# Patient Record
Sex: FEMALE | Race: WHITE | NOT HISPANIC OR LATINO | Employment: OTHER | URBAN - METROPOLITAN AREA
[De-identification: names, ages, dates, MRNs, and addresses within clinical notes are randomized per-mention and may not be internally consistent; named-entity substitution may affect disease eponyms.]

---

## 2017-06-09 ENCOUNTER — HOSPITAL ENCOUNTER (OUTPATIENT)
Dept: LAB | Facility: MEDICAL CENTER | Age: 72
End: 2017-06-09
Attending: FAMILY MEDICINE
Payer: MEDICARE

## 2017-06-09 LAB
ALBUMIN SERPL BCP-MCNC: 4.4 G/DL (ref 3.2–4.9)
ALBUMIN/GLOB SERPL: 1.6 G/DL
ALP SERPL-CCNC: 92 U/L (ref 30–99)
ALT SERPL-CCNC: 18 U/L (ref 2–50)
ANION GAP SERPL CALC-SCNC: 8 MMOL/L (ref 0–11.9)
AST SERPL-CCNC: 19 U/L (ref 12–45)
BASOPHILS # BLD AUTO: 0.4 % (ref 0–1.8)
BASOPHILS # BLD: 0.03 K/UL (ref 0–0.12)
BILIRUB SERPL-MCNC: 0.4 MG/DL (ref 0.1–1.5)
BUN SERPL-MCNC: 9 MG/DL (ref 8–22)
CALCIUM SERPL-MCNC: 9.8 MG/DL (ref 8.5–10.5)
CHLORIDE SERPL-SCNC: 104 MMOL/L (ref 96–112)
CHOLEST SERPL-MCNC: 195 MG/DL (ref 100–199)
CO2 SERPL-SCNC: 25 MMOL/L (ref 20–33)
CREAT SERPL-MCNC: 0.57 MG/DL (ref 0.5–1.4)
EOSINOPHIL # BLD AUTO: 0.06 K/UL (ref 0–0.51)
EOSINOPHIL NFR BLD: 0.8 % (ref 0–6.9)
ERYTHROCYTE [DISTWIDTH] IN BLOOD BY AUTOMATED COUNT: 44.2 FL (ref 35.9–50)
GFR SERPL CREATININE-BSD FRML MDRD: >60 ML/MIN/1.73 M 2
GLOBULIN SER CALC-MCNC: 2.7 G/DL (ref 1.9–3.5)
GLUCOSE SERPL-MCNC: 93 MG/DL (ref 65–99)
HCT VFR BLD AUTO: 41.6 % (ref 37–47)
HDLC SERPL-MCNC: 55 MG/DL
HGB BLD-MCNC: 13.8 G/DL (ref 12–16)
IMM GRANULOCYTES # BLD AUTO: 0.02 K/UL (ref 0–0.11)
IMM GRANULOCYTES NFR BLD AUTO: 0.3 % (ref 0–0.9)
LDLC SERPL CALC-MCNC: 121 MG/DL
LYMPHOCYTES # BLD AUTO: 2.21 K/UL (ref 1–4.8)
LYMPHOCYTES NFR BLD: 30.7 % (ref 22–41)
MCH RBC QN AUTO: 29.1 PG (ref 27–33)
MCHC RBC AUTO-ENTMCNC: 33.2 G/DL (ref 33.6–35)
MCV RBC AUTO: 87.6 FL (ref 81.4–97.8)
MONOCYTES # BLD AUTO: 0.61 K/UL (ref 0–0.85)
MONOCYTES NFR BLD AUTO: 8.5 % (ref 0–13.4)
NEUTROPHILS # BLD AUTO: 4.28 K/UL (ref 2–7.15)
NEUTROPHILS NFR BLD: 59.3 % (ref 44–72)
NRBC # BLD AUTO: 0 K/UL
NRBC BLD AUTO-RTO: 0 /100 WBC
PLATELET # BLD AUTO: 349 K/UL (ref 164–446)
PMV BLD AUTO: 11.3 FL (ref 9–12.9)
POTASSIUM SERPL-SCNC: 4.7 MMOL/L (ref 3.6–5.5)
PROT SERPL-MCNC: 7.1 G/DL (ref 6–8.2)
RBC # BLD AUTO: 4.75 M/UL (ref 4.2–5.4)
SODIUM SERPL-SCNC: 137 MMOL/L (ref 135–145)
TRIGL SERPL-MCNC: 95 MG/DL (ref 0–149)
TSH SERPL DL<=0.005 MIU/L-ACNC: 1.14 UIU/ML (ref 0.3–3.7)
WBC # BLD AUTO: 7.2 K/UL (ref 4.8–10.8)

## 2017-06-09 PROCEDURE — 36415 COLL VENOUS BLD VENIPUNCTURE: CPT

## 2017-06-09 PROCEDURE — 84443 ASSAY THYROID STIM HORMONE: CPT

## 2017-06-09 PROCEDURE — 80061 LIPID PANEL: CPT

## 2017-06-09 PROCEDURE — 85025 COMPLETE CBC W/AUTO DIFF WBC: CPT

## 2017-06-09 PROCEDURE — 80053 COMPREHEN METABOLIC PANEL: CPT

## 2017-06-13 ENCOUNTER — HOSPITAL ENCOUNTER (OUTPATIENT)
Dept: RADIOLOGY | Facility: MEDICAL CENTER | Age: 72
End: 2017-06-13
Attending: FAMILY MEDICINE
Payer: MEDICARE

## 2017-06-13 VITALS
HEART RATE: 68 BPM | TEMPERATURE: 97.5 F | HEIGHT: 61 IN | RESPIRATION RATE: 15 BRPM | DIASTOLIC BLOOD PRESSURE: 74 MMHG | SYSTOLIC BLOOD PRESSURE: 118 MMHG | WEIGHT: 178 LBS | OXYGEN SATURATION: 96 % | BODY MASS INDEX: 33.61 KG/M2

## 2017-06-13 DIAGNOSIS — G93.9 DISEASE OF BRAIN: ICD-10-CM

## 2017-06-13 DIAGNOSIS — G93.9 CONDITION OF BRAIN: ICD-10-CM

## 2017-06-13 DIAGNOSIS — M54.50 LOW BACK PAIN: ICD-10-CM

## 2017-06-13 DIAGNOSIS — M54.2 NECK PAIN: ICD-10-CM

## 2017-06-13 PROCEDURE — A9579 GAD-BASE MR CONTRAST NOS,1ML: HCPCS | Performed by: FAMILY MEDICINE

## 2017-06-13 PROCEDURE — 700101 HCHG RX REV CODE 250

## 2017-06-13 PROCEDURE — 72156 MRI NECK SPINE W/O & W/DYE: CPT

## 2017-06-13 PROCEDURE — 700111 HCHG RX REV CODE 636 W/ 250 OVERRIDE (IP)

## 2017-06-13 PROCEDURE — 70553 MRI BRAIN STEM W/O & W/DYE: CPT

## 2017-06-13 PROCEDURE — 700117 HCHG RX CONTRAST REV CODE 255: Performed by: FAMILY MEDICINE

## 2017-06-13 PROCEDURE — 72158 MRI LUMBAR SPINE W/O & W/DYE: CPT

## 2017-06-13 PROCEDURE — 72157 MRI CHEST SPINE W/O & W/DYE: CPT

## 2017-06-13 RX ADMIN — GADODIAMIDE 20 ML: 287 INJECTION INTRAVENOUS at 10:51

## 2017-06-13 ASSESSMENT — PAIN SCALES - GENERAL
PAINLEVEL_OUTOF10: 0

## 2017-06-13 NOTE — IP AVS SNAPSHOT
" <p align=\"LEFT\"><IMG SRC=\"[email or web address]\" alt=\"Image\" WIDTH=\"50%\" HEIGHT=\"200\" BORDER=\"\"></p>      `           Jessica Abad   MRN: [id number]    Department:  [address]   Date of Visit:              `  Discharge Instructions       MRI ADULT DISCHARGE INSTRUCTIONS    You have been medicated today for your scan. Please follow the instructions below to ensure your safe recovery. If you have any questions or problems, feel free to call us at [phone] or [phone].     1.   Have someone stay with you to assist you as needed.    2.   Do not drive or operate any mechanical devices.    3.   Do not perform any activity that requires concentration. Make no major decisions over the next 24 hours.     4.   Be careful changing positions from laying down to sitting or standing, as you may become dizzy.     5.   Do not drink alcohol for 48 hours.    6.   There are no restrictions for eating your normal meals. Drink fluids.    7.   You may continue your usual medications for pain, tranquilizers, muscle relaxants or sedatives when awake.     8.   Tomorrow, you may continue your normal daily activities.     9.   Pressure dressing on 10 - 15 minutes. If swelling or bleeding occurs when removed, continue placing direct pressure on injection site for another 5 minutes, or until bleeding stops.     I have been informed of and understand the above discharge instructions. Midazolam (VERSED)  What is this medicine?  You were given MIDAZOLAM (TE kelsey) for your procedure today. This medication is a benzodiazepine. It is used to cause relaxation or sleep before surgery and to block the memory of the procedure.  This medicine may be used for other purposes; ask your health care provider or pharmacist if you have questions.  What side effects may I notice from receiving this medicine?  Side effects that you should report to your doctor or health care professional as soon as " possible:  • allergic reactions like skin rash, itching or hives, swelling of the face, lips, or tongue  • breathing problems  • confusion  • dizziness or lightheadedness  • fast, irregular heartbeat  • halluninations during recovery  • numbness or tingling in the hands or feet  • pain, redness, or swelling at site where injected  • seizures  Side effects that usually do not require medical attention (report to your doctor or health care professional if they continue or are bothersome):  • coughing  • headache  • hiccups  • involuntary eye and muscle movements  • loss of memory of events just before, during, and after use  • nausea, vomiting  • speech problems  • tiredness  • trouble sleeping or nightmares  This list may not describe all possible side effects. Call your doctor for medical advice about side effects. You may report side effects to FDA at 6-953-FDA-1688.    Fentanyl  What is this medicine?  You were given FENTANYL (FEN ta nil) for your procedure today, it is a pain reliever. It is used to treat breakthrough pain that your long acting pain medicine does not control. Do not use this medicine for a pain that will go away in a few days like pain from surgery, doctor or dentist visits.   This medicine may be used for other purposes; ask your health care provider or pharmacist if you have questions.  What side effects may I notice from receiving this medicine?  Side effects that you should report to your doctor or health care professional as soon as possible:  • allergic reactions like skin rash, itching or hives, swelling of the face, lips, or tongue  • breathing problems  • changes in vision  • confusion  • dry mouth  • feeling faint, lightheaded  • hallucination  • irregular heartbeat  • mouth pain, sores  • problems with balance, talking, walking  • trouble passing urine or change in the amount of urine  • unusual bleeding or bruising  • unusually weak or tired  Side effects that usually do not require  medical attention (report to your doctor or health care professional if they continue or are bothersome):  • dizzy  • headache  • loss of appetite  • nausea, vomiting  • sweating  • tingling in mouth  This list may not describe all possible side effects. Call your doctor for medical advice about side effects. You may report side effects to [facility] at [phone]-[phone].  MRI ADULT DISCHARGE INSTRUCTIONS    You have been medicated today for your scan. Please follow the instructions below to ensure your safe recovery. If you have any questions or problems, feel free to call us at [phone] or [phone].     1.   Have someone stay with you to assist you as needed.    2.   Do not drive or operate any mechanical devices.    3.   Do not perform any activity that requires concentration. Make no major decisions over the next 24 hours.     4.   Be careful changing positions from laying down to sitting or standing, as you may become dizzy.     5.   Do not drink alcohol for 48 hours.    6.   There are no restrictions for eating your normal meals. Drink fluids.    7.   You may continue your usual medications for pain, tranquilizers, muscle relaxants or sedatives when awake.     8.   Tomorrow, you may continue your normal daily activities.     9.   Pressure dressing on 10 - 15 minutes. If swelling or bleeding occurs when removed, continue placing direct pressure on injection site for another 5 minutes, or until bleeding stops.     I have been informed of and understand the above discharge instructions.      `       Diet / Nutrition:    Follow any diet instructions given to you by your doctor or the dietician, including how much salt (sodium) you are allowed each day.    If you are overweight, talk to your doctor about a weight reduction plan.    Activity:    Remain physically active following your doctor's instructions about exercise and activity.    Rest often.     Any time you become even a little tired or short of breath, SIT DOWN and  rest.    Worsening Symptoms:    Report any of the following signs and symptoms to the doctor's office immediately:    *Pain of jaw, arm, or neck  *Chest pain not relieved by medication                               *Dizziness or loss of consciousness  *Difficulty breathing even when at rest   *More tired than usual                                       *Bleeding drainage or swelling of surgical site  *Swelling of feet, ankles, legs or stomach                 *Fever (>100ºF)  *Pink or blood tinged sputum  *Weight gain (3lbs/day or 5lbs /week)           *Shock from internal defibrillator (if applicable)  *Palpitations or irregular heartbeats                *Cool and/or numb extremities    Stroke Awareness    Common Risk Factors for Stroke include:    Age  Atrial Fibrillation  Carotid Artery Stenosis  Diabetes Mellitus  Excessive alcohol consumption  High blood pressure  Overweight   Physical inactivity  Smoking    Warning signs and symptoms of a stroke include:    *Sudden numbness or weakness of the face, arm or leg (especially on one side of the body).  *Sudden confusion, trouble speaking or understanding.  *Sudden trouble seeing in one or both eyes.  *Sudden trouble walking, dizziness, loss of balance or coordination.Sudden severe headache with no known cause.    It is very important to get treatment quickly when a stroke occurs. If you experience any of the above warning signs, call 911 immediately.                    `     Quit Smoking / Tobacco Use:    I understand the use of any tobacco products increases my chance of suffering from future heart disease or stroke and could cause other illnesses which may shorten my life. Quitting the use of tobacco products is the single most important thing I can do to improve my health. For further information on smoking / tobacco cessation call a Toll Free Quit Line at 1-459.449.2652 (*[employer] [place]) or 1-639.278.5411 (American [employer] Association) or you can access  the web based program at www.lungusa.org.    [place] Tobacco Users Help Line:  (796) 225-8066       Toll Free: 1-678.524.4271  Quit Tobacco Program [facility] (784)869-7660    Crisis Hotline:    [place] Crisis Hotline:  0-853-KEQBFHG or 1-534.191.4917    [place] Crisis Hotline:    1-236.557.8027 or 851-571-5099    Discharge Survey:   Thank you for choosing [facility]. We hope we did everything we could to make your hospital stay a pleasant one. You may be receiving a phone survey and we would appreciate your time and participation in answering the questions. Your input is very valuable to us in our efforts to improve our service to our patients and their families.        My signature on this form indicates that:    1. I have reviewed and understand the above information.  2. My questions regarding this information have been answered to my satisfaction.  3. I have formulated a plan with my discharge nurse to obtain my prescribed medications for home.                   `           Patient or Caregiver Signature:  ____________________________________________________________    Date:  ____________________________________________________________       `

## 2017-06-13 NOTE — DISCHARGE INSTRUCTIONS
MRI ADULT DISCHARGE INSTRUCTIONS    You have been medicated today for your scan. Please follow the instructions below to ensure your safe recovery. If you have any questions or problems, feel free to call us at [phone] or [phone].     1.   Have someone stay with you to assist you as needed.    2.   Do not drive or operate any mechanical devices.    3.   Do not perform any activity that requires concentration. Make no major decisions over the next 24 hours.     4.   Be careful changing positions from laying down to sitting or standing, as you may become dizzy.     5.   Do not drink alcohol for 48 hours.    6.   There are no restrictions for eating your normal meals. Drink fluids.    7.   You may continue your usual medications for pain, tranquilizers, muscle relaxants or sedatives when awake.     8.   Tomorrow, you may continue your normal daily activities.     9.   Pressure dressing on 10 - 15 minutes. If swelling or bleeding occurs when removed, continue placing direct pressure on injection site for another 5 minutes, or until bleeding stops.     I have been informed of and understand the above discharge instructions. Midazolam (VERSED)  What is this medicine?  You were given MIDAZOLAM (TE kelsey) for your procedure today. This medication is a benzodiazepine. It is used to cause relaxation or sleep before surgery and to block the memory of the procedure.  This medicine may be used for other purposes; ask your health care provider or pharmacist if you have questions.  What side effects may I notice from receiving this medicine?  Side effects that you should report to your doctor or health care professional as soon as possible:  • allergic reactions like skin rash, itching or hives, swelling of the face, lips, or tongue  • breathing problems  • confusion  • dizziness or lightheadedness  • fast, irregular heartbeat  • halluninations during recovery  • numbness or tingling in the hands or feet  • pain, redness,  or swelling at site where injected  • seizures  Side effects that usually do not require medical attention (report to your doctor or health care professional if they continue or are bothersome):  • coughing  • headache  • hiccups  • involuntary eye and muscle movements  • loss of memory of events just before, during, and after use  • nausea, vomiting  • speech problems  • tiredness  • trouble sleeping or nightmares  This list may not describe all possible side effects. Call your doctor for medical advice about side effects. You may report side effects to FDA at 5-856-TJS-9710.    Fentanyl  What is this medicine?  You were given FENTANYL (FEN ta nil) for your procedure today, it is a pain reliever. It is used to treat breakthrough pain that your long acting pain medicine does not control. Do not use this medicine for a pain that will go away in a few days like pain from surgery, doctor or dentist visits.   This medicine may be used for other purposes; ask your health care provider or pharmacist if you have questions.  What side effects may I notice from receiving this medicine?  Side effects that you should report to your doctor or health care professional as soon as possible:  • allergic reactions like skin rash, itching or hives, swelling of the face, lips, or tongue  • breathing problems  • changes in vision  • confusion  • dry mouth  • feeling faint, lightheaded  • hallucination  • irregular heartbeat  • mouth pain, sores  • problems with balance, talking, walking  • trouble passing urine or change in the amount of urine  • unusual bleeding or bruising  • unusually weak or tired  Side effects that usually do not require medical attention (report to your doctor or health care professional if they continue or are bothersome):  • dizzy  • headache  • loss of appetite  • nausea, vomiting  • sweating  • tingling in mouth  This list may not describe all possible side effects. Call your doctor for medical advice about  side effects. You may report side effects to FDA at 8-189-UQL-1384.  MRI ADULT DISCHARGE INSTRUCTIONS    You have been medicated today for your scan. Please follow the instructions below to ensure your safe recovery. If you have any questions or problems, feel free to call us at [phone] or [phone].     1.   Have someone stay with you to assist you as needed.    2.   Do not drive or operate any mechanical devices.    3.   Do not perform any activity that requires concentration. Make no major decisions over the next 24 hours.     4.   Be careful changing positions from laying down to sitting or standing, as you may become dizzy.     5.   Do not drink alcohol for 48 hours.    6.   There are no restrictions for eating your normal meals. Drink fluids.    7.   You may continue your usual medications for pain, tranquilizers, muscle relaxants or sedatives when awake.     8.   Tomorrow, you may continue your normal daily activities.     9.   Pressure dressing on 10 - 15 minutes. If swelling or bleeding occurs when removed, continue placing direct pressure on injection site for another 5 minutes, or until bleeding stops.     I have been informed of and understand the above discharge instructions.

## 2017-07-06 ENCOUNTER — OFFICE VISIT (OUTPATIENT)
Dept: NEUROLOGY | Facility: MEDICAL CENTER | Age: 72
End: 2017-07-06
Payer: MEDICARE

## 2017-07-06 VITALS
OXYGEN SATURATION: 92 % | WEIGHT: 177.6 LBS | HEART RATE: 81 BPM | TEMPERATURE: 98 F | RESPIRATION RATE: 16 BRPM | BODY MASS INDEX: 33.57 KG/M2 | SYSTOLIC BLOOD PRESSURE: 128 MMHG | DIASTOLIC BLOOD PRESSURE: 84 MMHG

## 2017-07-06 DIAGNOSIS — R90.89 ABNORMAL BRAIN MRI: ICD-10-CM

## 2017-07-06 DIAGNOSIS — M47.817 LUMBOSACRAL SPONDYLOSIS WITHOUT MYELOPATHY: ICD-10-CM

## 2017-07-06 PROCEDURE — 99212 OFFICE O/P EST SF 10 MIN: CPT | Performed by: NURSE PRACTITIONER

## 2017-07-06 RX ORDER — TIZANIDINE 4 MG/1
4 TABLET ORAL EVERY 6 HOURS PRN
COMMUNITY
End: 2019-04-30 | Stop reason: SDUPTHER

## 2017-07-06 RX ORDER — ROPINIROLE 2 MG/1
2 TABLET, FILM COATED ORAL NIGHTLY
COMMUNITY
End: 2019-01-29

## 2017-07-06 ASSESSMENT — PAIN SCALES - GENERAL: PAINLEVEL: 6=MODERATE PAIN

## 2017-07-06 ASSESSMENT — PATIENT HEALTH QUESTIONNAIRE - PHQ9: CLINICAL INTERPRETATION OF PHQ2 SCORE: 0

## 2017-07-06 NOTE — PROGRESS NOTES
Subjective:      Jessica Abad is a 71 y.o. female who presents with Follow-Up for Polyneuropathy pain, back pain, multiple medication concerns:        HPI  Here for a review of recent MRI's that were ordered by her PCP.  She has been unable to discuss with the PCP and thus is here.    Brain MRI:  1.  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 On today's exam, there are 2 tiny enhancing nodular foci measuring 3 mm and 5 mm respectively. No interval increase in overall size or   mass effect of this lesion. 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 This does not necessarily represent progression of tumor grade. Recommend follow-up imaging surveillance as clinically   indicated.  2.  Mild cerebral atrophy. Age-appropriate.  3.  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      Current Outpatient Prescriptions   Medication Sig Dispense Refill   • tizanidine (ZANAFLEX) 4 MG Tab Take 4 mg by mouth every 6 hours as needed.     • ropinirole (REQUIP) 2 MG tablet Take 2 mg by mouth every evening.     • Coenzyme Q10 100 MG Cap Take 1 Cap by mouth every day.     • ascorbic acid (ASCORBIC ACID) 500 MG Tab Take 1,000 mg by mouth 2 Times a Day.     • Probiotic Product (PROBIOTIC DAILY PO) Take 1 Cap by mouth every day.     • zolpidem (AMBIEN) 10 MG Tab Take 1 Tab by mouth at bedtime as needed for Sleep. 90 Tab 1   • propranolol (INDERAL) 40 MG Tab Take 1 Tab by mouth 2 times a day. (Patient taking differently: Take 40 mg by mouth 2 times a day. TAKES FOR TREMORS) 60 Tab 11   • diphenhydrAMINE (BENADRYL) 50 MG Cap Take 1 Cap by mouth 3 times a day. 90 Cap 5   • clonazepam (KLONOPIN) 1 MG Tab Take 2 Tabs by mouth every bedtime. 60 Tab 5   • tramadol (ULTRAM) 50 MG TABS Take 50 mg by mouth 3 times a day.     • CRANBERRY PO Take 3 Tabs by  mouth 2 Times a Day.     • gabapentin (NEURONTIN) 800 MG tablet Take 1 Tab by mouth 4 times a day. 120 Tab 5   • ibuprofen (MOTRIN) 800 MG TABS Take 1 Tab by mouth every 8 hours as needed for Mild Pain. WITH FOOD 90 Each 3   • Cholecalciferol (VITAMIN D) 2000 UNITS CAPS Take 1 Cap by mouth every day.       • Non Formulary Request Take 1 Tab by mouth 3 times a day. **BETA TCD** for gallbladder     • Non Formulary Request Take 30 Drops by mouth 3 times a day. **SUPER PHOSPHATE LIQ**     • Non Formulary Request Take 1 Tab by mouth every bedtime. **MAGNESIUM AND POTASSIUM ASPARTATE**     • Non Formulary Request Take 1 Tab by mouth 3 times a day. **LIVOTRIT PLUS**     • cyclobenzaprine (FLEXERIL) 10 MG TABS Take 1 Tab by mouth 3 times a day as needed. (Patient not taking: Reported on 7/6/2017) 30 Tab 0     No current facility-administered medications for this visit.       Review of Systems   Constitutional: Positive for malaise/fatigue.   HENT: Negative for hearing loss, nosebleeds and sore throat.         No recent head injury.   Eyes: Negative for double vision.        No new loss of vision.   Respiratory: Negative for cough.         No recent lung infections.   Cardiovascular: Negative for chest pain.   Gastrointestinal: Negative for abdominal pain, diarrhea, nausea and vomiting.   Genitourinary: Negative.    Musculoskeletal: Positive for back pain, joint pain and neck pain.   Skin: Negative.    Neurological: Negative for headaches.   Endo/Heme/Allergies:        No history of endocrine dysfunction.  No new problems.   Psychiatric/Behavioral: Positive for depression. The patient is not nervous/anxious.         No recent mood changes.          Objective:     /84 mmHg  Pulse 81  Temp(Src) 36.7 °C (98 °F)  Resp 16  Wt 80.559 kg (177 lb 9.6 oz)  SpO2 92%     Physical Exam   Constitutional: She is oriented to person, place, and time. She appears well-developed and well-nourished. No distress.   HENT:   Head:  Normocephalic and atraumatic.   Eyes: EOM are normal.   Neck: Normal range of motion.   Cardiovascular: Normal rate and regular rhythm.    Pulmonary/Chest: Effort normal.   Neurological: She is alert and oriented to person, place, and time. She exhibits normal muscle tone. Gait (minimal assist with a cane.) abnormal.   No observable changes in neurologic status.  See initial new patient examination for details.    Skin: Skin is warm.   Psychiatric: She has a normal mood and affect.               Assessment/Plan:     Reviewed her images at length with Dr Preciado and Ms Abad.  At this time, it would be recommended to perform a serial Brain MRI in 6 months.    Referral placed for [facility] [place].    To return in 6 months or sooner if needed.    Addendum Date 3/9/2018:  To my knowledge, there have been no medical changes to Ms Abad in the past 30 days.  GEO Ochoa

## 2017-07-06 NOTE — MR AVS SNAPSHOT
Jessica Abad   2017 2:40 PM   Office Visit   MRN: [id number]    Department:  [facility]   Dept Phone:  [phone]    Description:  Female : 1945   Provider:  SHAYE Lopez           Reason for Visit     Follow-Up MRI result - 2nd opinion      Allergies as of 2017     Allergen Noted Reactions    Nsaids 2014   Hives, Rash, Itching    Asa [Aspirin] 10/16/2009       BAD ACID REFLUX    Codeine 10/16/2009       Sulfa Drugs 10/16/2009         You were diagnosed with     Lumbosacral spondylosis without myelopathy   [721.3.ICD-9-CM]       Abnormal brain MRI   [799897]         Vital Signs     Blood Pressure Pulse Temperature Respirations Weight Oxygen Saturation    128/84 mmHg 81 36.7 °C (98 °F) 16 80.559 kg (177 lb 9.6 oz) 92%    Smoking Status                   Never Smoker            Basic Information     Date Of Birth Sex Race Ethnicity Preferred Language    1945 Female White Non- English      Problem List              ICD-10-CM Priority Class Noted - Resolved    Fibromyalgia M79.7   Unknown - Present    RLS (restless legs syndrome) G25.81   Unknown - Present    Cerebral infarction (CMS-HCC) I63.9   Unknown - Present    Ovarian cyst, right N83.201   2011 - Present    Anemia D64.9   2011 - Present    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, 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   Unknown - Present    Anxiety F41.9   2011 - Present    Back pain M54.9   2014 - Present    HTN (hypertension) I10   2015 - Present    Lumbosacral spondylosis without myelopathy M47.817   3/13/2015 - Present      Health Maintenance        Date Due Completion Dates    PAP SMEAR 1967 ---    MAMMOGRAM 1986 ---    COLONOSCOPY 1996 ---    IMM ZOSTER VACCINE 2006 ---    IMM DTaP/Tdap/Td Vaccine (1 - Tdap) 10/17/2009 10/16/2009    BONE DENSITY 2011 ---    IMM PNEUMOCOCCAL 65+ (ADULT) LOW/MEDIUM RISK SERIES (1 of 2 - PCV13) 2011 ---    IMM  INFLUENZA (1) 9/1/2017 ---            Current Immunizations     TD Vaccine 10/16/2009  5:30 PM      Below and/or attached are the medications your provider expects you to take. Review all of your home medications and newly ordered medications with your provider and/or pharmacist. Follow medication instructions as directed by your provider and/or pharmacist. Please keep your medication list with you and share with your provider. Update the information when medications are discontinued, doses are changed, or new medications (including over-the-counter products) are added; and carry medication information at all times in the event of emergency situations     Allergies:  NSAIDS - Hives,Rash,Itching     ASA - (reactions not documented)     CODEINE - (reactions not documented)     SULFA DRUGS - (reactions not documented)               Medications  Valid as of: July 06, 2017 -  3:30 PM    Generic Name Brand Name Tablet Size Instructions for use    Ascorbic Acid (Tab) ascorbic acid 500 MG Take 1,000 mg by mouth 2 Times a Day.        Cholecalciferol (Cap) Vitamin D 2000 UNITS Take 1 Cap by mouth every day.          ClonazePAM (Tab) KLONOPIN 1 MG Take 2 Tabs by mouth every bedtime.        Coenzyme Q10 (Cap) Coenzyme Q10 100 MG Take 1 Cap by mouth every day.        Cranberry   Take 3 Tabs by mouth 2 Times a Day.        Cyclobenzaprine HCl (Tab) FLEXERIL 10 MG Take 1 Tab by mouth 3 times a day as needed.        DiphenhydrAMINE HCl (Cap) BENADRYL 50 MG Take 1 Cap by mouth 3 times a day.        Gabapentin (Tab) NEURONTIN 800 MG Take 1 Tab by mouth 4 times a day.        Ibuprofen (Tab) MOTRIN 800 MG Take 1 Tab by mouth every 8 hours as needed for Mild Pain. WITH FOOD        Non Formulary Request Non Formulary Request  Take 1 Tab by mouth 3 times a day. **BETA TCD** for gallbladder        Non Formulary Request Non Formulary Request  Take 30 Drops by mouth 3 times a day. **SUPER PHOSPHATE LIQ**        Non Formulary Request Non  Formulary Request  Take 1 Tab by mouth every bedtime. **MAGNESIUM AND POTASSIUM ASPARTATE**        Non Formulary Request Non Formulary Request  Take 1 Tab by mouth 3 times a day. **LIVOTRIT PLUS**        Probiotic Product   Take 1 Cap by mouth every day.        Propranolol HCl (Tab) INDERAL 40 MG Take 1 Tab by mouth 2 times a day.        ROPINIRole HCl (Tab) REQUIP 2 MG Take 2 mg by mouth every evening.        TiZANidine HCl (Tab) ZANAFLEX 4 MG Take 4 mg by mouth every 6 hours as needed.        TraMADol HCl (Tab) ULTRAM 50 MG Take 50 mg by mouth 3 times a day.        Zolpidem Tartrate (Tab) AMBIEN 10 MG Take 1 Tab by mouth at bedtime as needed for Sleep.        .                 Medicines prescribed today were sent to:     [id number] - [place], [place] - [address]    [address] [place] [address]    Phone: [phone] Fax: [phone]    Open 24 Hours?: No      Medication refill instructions:       If your prescription bottle indicates you have medication refills left, it is not necessary to call your provider’s office. Please contact your pharmacy and they will refill your medication.    If your prescription bottle indicates you do not have any refills left, you may request refills at any time through one of the following ways: The online [employer] system (except Urgent Care), by calling your provider’s office, or by asking your pharmacy to contact your provider’s office with a refill request. Medication refills are processed only during regular business hours and may not be available until the next business day. Your provider may request additional information or to have a follow-up visit with you prior to refilling your medication.   *Please Note: Medication refills are assigned a new Rx number when refilled electronically. Your pharmacy may indicate that no refills were authorized even though a new prescription for the same medication is available at the pharmacy. Please request the medicine by name  with the pharmacy before contacting your provider for a refill.        Your To Do List     Future Labs/Procedures Complete By Expires    MR-BRAIN-WITH & W/O  As directed 7/6/2018      Referral     A referral request has been sent to our patient care coordination department. Please allow 3-5 business days for us to process this request and contact you either by phone or mail. If you do not hear from us by the 5th business day, please call us at [phone].        Other Notes About Your Plan     UROLOGIST DR TARAN Thapa Access Code: [email or web address]  Expires: 7/9/2017 11:17 AM    [employer]  A secure, online tool to manage your health information     [employer]’s [employer]® is a secure, online tool that connects you to your personalized health information from the privacy of your home -- day or night - making it very easy for you to manage your healthcare. Once the activation process is completed, you can even access your medical information using the [employer] jaycob, which is available for free in the Apple Jacyob store or Google Play store.     [employer] provides the following levels of access (as shown below):   My Chart Features   [facility] Primary Care Doctor [facility]  Specialists [facility]  Urgent  Care Non-[facility]  Primary Care  Doctor   Email your healthcare team securely and privately 24/7 X X X    Manage appointments: schedule your next appointment; view details of past/upcoming appointments X      Request prescription refills. X      View recent personal medical records, including lab and immunizations X X X X   View health record, including health history, allergies, medications X X X X   Read reports about your outpatient visits, procedures, consult and ER notes X X X X   See your discharge summary, which is a recap of your hospital and/or ER visit that includes your diagnosis, lab results, and care plan. X X       How to register for [employer]:  1. Go to  [email or web address].  2. Click on the Sign Up  Now box, which takes you to the New Member Sign Up page. You will need to provide the following information:  a. Enter your [employer] Access Code exactly as it appears at the top of this page. (You will not need to use this code after you’ve completed the sign-up process. If you do not sign up before the expiration date, you must request a new code.)   b. Enter your date of birth.   c. Enter your home email address.   d. Click Submit, and follow the next screen’s instructions.  3. Create a [employer] ID. This will be your [employer] login ID and cannot be changed, so think of one that is secure and easy to remember.  4. Create a [employer] password. You can change your password at any time.  5. Enter your Password Reset Question and Answer. This can be used at a later time if you forget your password.   6. Enter your e-mail address. This allows you to receive e-mail notifications when new information is available in [employer].  7. Click Sign Up. You can now view your health information.    For assistance activating your [employer] account, call [phone]

## 2017-08-04 ENCOUNTER — HOSPITAL ENCOUNTER (OUTPATIENT)
Facility: MEDICAL CENTER | Age: 72
End: 2017-08-04
Attending: FAMILY MEDICINE
Payer: MEDICARE

## 2017-08-04 PROCEDURE — 87086 URINE CULTURE/COLONY COUNT: CPT

## 2017-08-06 LAB
BACTERIA UR CULT: NORMAL
SIGNIFICANT IND 70042: NORMAL
SOURCE SOURCE: NORMAL

## 2017-12-08 DIAGNOSIS — I63.9 CEREBRAL INFARCTION, UNSPECIFIED MECHANISM (HCC): ICD-10-CM

## 2017-12-08 DIAGNOSIS — R90.89 ABNORMAL BRAIN MRI: ICD-10-CM

## 2017-12-09 ENCOUNTER — PATIENT OUTREACH (OUTPATIENT)
Dept: HEALTH INFORMATION MANAGEMENT | Facility: OTHER | Age: 72
End: 2017-12-09

## 2017-12-20 ENCOUNTER — TELEPHONE (OUTPATIENT)
Dept: NEUROLOGY | Facility: MEDICAL CENTER | Age: 72
End: 2017-12-20

## 2018-01-04 ENCOUNTER — TELEPHONE (OUTPATIENT)
Dept: NEUROLOGY | Facility: MEDICAL CENTER | Age: 73
End: 2018-01-04

## 2018-01-04 DIAGNOSIS — Q04.8 DYSPLASTIC CEREBELLAR GANGLIOCYTOMA (HCC): ICD-10-CM

## 2018-01-04 DIAGNOSIS — D36.10 DYSPLASTIC CEREBELLAR GANGLIOCYTOMA (HCC): ICD-10-CM

## 2018-01-05 NOTE — TELEPHONE ENCOUNTER
Could you please order this? Is it needed?    Labs with LIP order: Creatinine within past 60 days (required for patients 60 years old or greater, patients with ARF, or patients with diabetes); Pregnancy test if indicated for women ages 12-60.    So that patient can get MRI done

## 2018-02-09 ENCOUNTER — APPOINTMENT (OUTPATIENT)
Dept: RADIOLOGY | Facility: MEDICAL CENTER | Age: 73
End: 2018-02-09
Attending: NURSE PRACTITIONER
Payer: MEDICARE

## 2018-02-09 ENCOUNTER — TELEPHONE (OUTPATIENT)
Dept: NEUROLOGY | Facility: MEDICAL CENTER | Age: 73
End: 2018-02-09

## 2018-02-09 NOTE — TELEPHONE ENCOUNTER
Per Alessandra in imaging patient needs lab orders for BMP and CBC prior to her MRI with sedation. Could you please put these orders into EPIC? Thanks.

## 2018-03-06 DIAGNOSIS — R94.31 ABNORMAL ELECTROCARDIOGRAM: ICD-10-CM

## 2018-03-06 DIAGNOSIS — Z86.2 HISTORY OF ANEMIA: ICD-10-CM

## 2018-03-06 DIAGNOSIS — R93.89 ABNORMAL MRI: ICD-10-CM

## 2018-03-08 ENCOUNTER — HOSPITAL ENCOUNTER (OUTPATIENT)
Dept: CARDIOLOGY | Facility: MEDICAL CENTER | Age: 73
End: 2018-03-08
Attending: NURSE PRACTITIONER
Payer: MEDICARE

## 2018-03-08 ENCOUNTER — HOSPITAL ENCOUNTER (OUTPATIENT)
Dept: LAB | Facility: MEDICAL CENTER | Age: 73
End: 2018-03-08
Attending: NURSE PRACTITIONER
Payer: MEDICARE

## 2018-03-08 DIAGNOSIS — Q04.8 DYSPLASTIC CEREBELLAR GANGLIOCYTOMA (HCC): ICD-10-CM

## 2018-03-08 DIAGNOSIS — Z86.2 HISTORY OF ANEMIA: ICD-10-CM

## 2018-03-08 DIAGNOSIS — D36.10 DYSPLASTIC CEREBELLAR GANGLIOCYTOMA (HCC): ICD-10-CM

## 2018-03-08 LAB
BASOPHILS # BLD AUTO: 0.5 % (ref 0–1.8)
BASOPHILS # BLD: 0.04 K/UL (ref 0–0.12)
CREAT SERPL-MCNC: 0.77 MG/DL (ref 0.5–1.4)
EKG IMPRESSION: NORMAL
EOSINOPHIL # BLD AUTO: 0.15 K/UL (ref 0–0.51)
EOSINOPHIL NFR BLD: 1.8 % (ref 0–6.9)
ERYTHROCYTE [DISTWIDTH] IN BLOOD BY AUTOMATED COUNT: 44.2 FL (ref 35.9–50)
HCT VFR BLD AUTO: 43.6 % (ref 37–47)
HGB BLD-MCNC: 14 G/DL (ref 12–16)
IMM GRANULOCYTES # BLD AUTO: 0.02 K/UL (ref 0–0.11)
IMM GRANULOCYTES NFR BLD AUTO: 0.2 % (ref 0–0.9)
LYMPHOCYTES # BLD AUTO: 2.97 K/UL (ref 1–4.8)
LYMPHOCYTES NFR BLD: 36.1 % (ref 22–41)
MCH RBC QN AUTO: 28.7 PG (ref 27–33)
MCHC RBC AUTO-ENTMCNC: 32.1 G/DL (ref 33.6–35)
MCV RBC AUTO: 89.3 FL (ref 81.4–97.8)
MONOCYTES # BLD AUTO: 0.69 K/UL (ref 0–0.85)
MONOCYTES NFR BLD AUTO: 8.4 % (ref 0–13.4)
NEUTROPHILS # BLD AUTO: 4.36 K/UL (ref 2–7.15)
NEUTROPHILS NFR BLD: 53 % (ref 44–72)
NRBC # BLD AUTO: 0 K/UL
NRBC BLD-RTO: 0 /100 WBC
PLATELET # BLD AUTO: 346 K/UL (ref 164–446)
PMV BLD AUTO: 11.4 FL (ref 9–12.9)
RBC # BLD AUTO: 4.88 M/UL (ref 4.2–5.4)
WBC # BLD AUTO: 8.2 K/UL (ref 4.8–10.8)

## 2018-03-08 PROCEDURE — 82565 ASSAY OF CREATININE: CPT

## 2018-03-08 PROCEDURE — 93010 ELECTROCARDIOGRAM REPORT: CPT | Performed by: INTERNAL MEDICINE

## 2018-03-08 PROCEDURE — 36415 COLL VENOUS BLD VENIPUNCTURE: CPT

## 2018-03-08 PROCEDURE — 85025 COMPLETE CBC W/AUTO DIFF WBC: CPT

## 2018-03-08 PROCEDURE — 93005 ELECTROCARDIOGRAM TRACING: CPT | Performed by: NURSE PRACTITIONER

## 2018-03-09 ENCOUNTER — TELEPHONE (OUTPATIENT)
Dept: NEUROLOGY | Facility: MEDICAL CENTER | Age: 73
End: 2018-03-09

## 2018-03-09 ASSESSMENT — ENCOUNTER SYMPTOMS
DOUBLE VISION: 0
DIARRHEA: 0
DEPRESSION: 1
HEADACHES: 0
NERVOUS/ANXIOUS: 0
VOMITING: 0
NAUSEA: 0
NECK PAIN: 1
ABDOMINAL PAIN: 0
BACK PAIN: 1
COUGH: 0
SORE THROAT: 0

## 2018-03-09 NOTE — TELEPHONE ENCOUNTER
Spoke with Jessenia in imaging scheduling. She stated that everything patient needed to have completed prior to her MRI on Monday was completed.

## 2018-03-09 NOTE — TELEPHONE ENCOUNTER
Left voicemail on patient's phone letting her know that I had spoken to Jessenia in imaging scheduling and that everything was good to go for her MRI with sedation on Monday.

## 2018-03-09 NOTE — TELEPHONE ENCOUNTER
Just listened to my office voice mail from Adalid Abad.  There has been multiple mix-ups with her diagnostic testing needed for her MRI scheduled for Monday.  Can you please call the MRI  to ensure that they have everything necessary to proceed with her procedure then call Mr Abad back with an update?

## 2018-03-12 ENCOUNTER — HOSPITAL ENCOUNTER (OUTPATIENT)
Dept: RADIOLOGY | Facility: MEDICAL CENTER | Age: 73
End: 2018-03-12
Attending: NURSE PRACTITIONER
Payer: MEDICARE

## 2018-03-12 VITALS
OXYGEN SATURATION: 92 % | HEIGHT: 61 IN | BODY MASS INDEX: 33.99 KG/M2 | WEIGHT: 180 LBS | TEMPERATURE: 97.9 F | RESPIRATION RATE: 18 BRPM | SYSTOLIC BLOOD PRESSURE: 125 MMHG | DIASTOLIC BLOOD PRESSURE: 72 MMHG | HEART RATE: 74 BPM

## 2018-03-12 DIAGNOSIS — R90.89 ABNORMAL BRAIN MRI: ICD-10-CM

## 2018-03-12 DIAGNOSIS — R52 PAIN: ICD-10-CM

## 2018-03-12 DIAGNOSIS — I63.9 CEREBRAL INFARCTION, UNSPECIFIED MECHANISM (HCC): ICD-10-CM

## 2018-03-12 PROCEDURE — 70553 MRI BRAIN STEM W/O & W/DYE: CPT

## 2018-03-12 PROCEDURE — 700111 HCHG RX REV CODE 636 W/ 250 OVERRIDE (IP)

## 2018-03-12 PROCEDURE — 700117 HCHG RX CONTRAST REV CODE 255: Performed by: NURSE PRACTITIONER

## 2018-03-12 PROCEDURE — A9579 GAD-BASE MR CONTRAST NOS,1ML: HCPCS | Performed by: NURSE PRACTITIONER

## 2018-03-12 RX ADMIN — GADODIAMIDE 18 ML: 287 INJECTION INTRAVENOUS at 10:04

## 2018-03-12 ASSESSMENT — PAIN SCALES - GENERAL
PAINLEVEL_OUTOF10: 0

## 2018-03-12 NOTE — DISCHARGE INSTRUCTIONS
MRI ADULT DISCHARGE INSTRUCTIONS    You have been medicated today for your scan. Please follow the instructions below to ensure your safe recovery. If you have any questions or problems, feel free to call us at [phone] or [phone].     1.   Have someone stay with you to assist you as needed.    2.   Do not drive or operate any mechanical devices.    3.   Do not perform any activity that requires concentration. Make no major decisions over the next 24 hours.     4.   Be careful changing positions from laying down to sitting or standing, as you may become dizzy.     5.   Do not drink alcohol for 48 hours.    6.   There are no restrictions for eating your normal meals. Drink fluids.    7.   You may continue your usual medications for pain, tranquilizers, muscle relaxants or sedatives when awake.     8.   Tomorrow, you may continue your normal daily activities.     9.   Pressure dressing on 10 - 15 minutes. If swelling or bleeding occurs when removed, continue placing direct pressure on injection site for another 5 minutes, or until bleeding stops.     I have been informed of and understand the above discharge instructions.

## 2018-03-15 ENCOUNTER — TELEPHONE (OUTPATIENT)
Dept: NEUROLOGY | Facility: MEDICAL CENTER | Age: 73
End: 2018-03-15

## 2018-03-15 NOTE — TELEPHONE ENCOUNTER
Please let Destinye and  know that her Brain MRI results are improved in comparison to her last study in 2017.    1.  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 Previously noted nodular areas of enhancement in this lesion have resolved.    2.  Stable small area of chronic lacunar infarction in the right caudate nucleus.

## 2018-03-21 NOTE — TELEPHONE ENCOUNTER
Spoke with patient today, she states that she had previously read these results on he [facility]. But was very happy and relieved to hear again that her MRI was improved.

## 2018-05-10 ENCOUNTER — HOSPITAL ENCOUNTER (OUTPATIENT)
Dept: RADIOLOGY | Facility: MEDICAL CENTER | Age: 73
End: 2018-05-10
Attending: OBSTETRICS & GYNECOLOGY
Payer: MEDICARE

## 2018-05-10 DIAGNOSIS — N64.4 MASTODYNIA: ICD-10-CM

## 2018-05-10 PROCEDURE — 76642 ULTRASOUND BREAST LIMITED: CPT | Mod: RT

## 2018-05-10 PROCEDURE — G0279 TOMOSYNTHESIS, MAMMO: HCPCS

## 2019-01-28 ENCOUNTER — HOSPITAL ENCOUNTER (OUTPATIENT)
Facility: MEDICAL CENTER | Age: 74
End: 2019-01-28
Attending: NURSE PRACTITIONER
Payer: MEDICARE

## 2019-01-28 ENCOUNTER — OFFICE VISIT (OUTPATIENT)
Dept: DERMATOLOGY | Facility: IMAGING CENTER | Age: 74
End: 2019-01-28
Payer: MEDICARE

## 2019-01-28 VITALS
WEIGHT: 177 LBS | HEART RATE: 78 BPM | BODY MASS INDEX: 34.75 KG/M2 | HEIGHT: 60 IN | SYSTOLIC BLOOD PRESSURE: 124 MMHG | TEMPERATURE: 98.3 F | DIASTOLIC BLOOD PRESSURE: 84 MMHG

## 2019-01-28 DIAGNOSIS — L71.9 ROSACEA: ICD-10-CM

## 2019-01-28 DIAGNOSIS — L57.0 ACTINIC KERATOSES: ICD-10-CM

## 2019-01-28 DIAGNOSIS — D48.5 NEOPLASM OF UNCERTAIN BEHAVIOR OF SKIN OF EAR: ICD-10-CM

## 2019-01-28 DIAGNOSIS — L82.1 SEBORRHEIC KERATOSES: ICD-10-CM

## 2019-01-28 PROBLEM — H16.139 ACTINIC KERATITIS: Status: ACTIVE | Noted: 2019-01-28

## 2019-01-28 PROCEDURE — 88305 TISSUE EXAM BY PATHOLOGIST: CPT

## 2019-01-28 PROCEDURE — 11102 TANGNTL BX SKIN SINGLE LES: CPT | Mod: 59 | Performed by: NURSE PRACTITIONER

## 2019-01-28 PROCEDURE — 17110 DESTRUCTION B9 LES UP TO 14: CPT | Performed by: NURSE PRACTITIONER

## 2019-01-28 PROCEDURE — 17000 DESTRUCT PREMALG LESION: CPT | Mod: 59 | Performed by: NURSE PRACTITIONER

## 2019-01-28 PROCEDURE — 99203 OFFICE O/P NEW LOW 30 MIN: CPT | Mod: 25 | Performed by: NURSE PRACTITIONER

## 2019-01-28 RX ORDER — LISINOPRIL 20 MG/1
TABLET ORAL
COMMUNITY
Start: 2018-11-07 | End: 2021-12-21 | Stop reason: SDUPTHER

## 2019-01-28 RX ORDER — METRONIDAZOLE 7.5 MG/G
1 GEL TOPICAL 2 TIMES DAILY
Qty: 1 TUBE | Refills: 1 | Status: SHIPPED | OUTPATIENT
Start: 2019-01-28 | End: 2019-01-29

## 2019-01-28 NOTE — PROGRESS NOTES
Chief Complaint   Patient presents with   • Establish Care     MANUEL   • Skin Lesion         HISTORY OF THE PRESENT ILLNESS: Patient is a 73 y.o. female. This pleasant patient is here today requesting a skin check in with concerns regarding a skin lesion on her right ear, itchy lesion on her right mid back, also concerns regarding bumps on her cheek that are recurrent.  She reports she also had an incident of painful lesions on one side of her face that resolved spontaneously she did not seek any treatment for this.  She reports no personal or family history of skin cancer she has never smoked or chewed tobacco.  She has had many years of sun exposure as she was a swimmer growing up in [place] and [place] also living in [place] for 5 years.      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  She has noticed a rough lesion on her right ear for about two months.     Rosacea  She has redness and pustules on her cheeks for about two years.     Actinic keratoses  Patient has a rough lesion inside her right ear.       Allergies: Nsaids; Asa [aspirin]; Codeine; and Sulfa drugs                    Review of Systems   Constitutional: Negative for fever, chills, weight loss and malaise/fatigue.   Skin: Positive for skin lesion on her right ear also on her right mid back that is itchy    Exam: Blood pressure 124/84, pulse 78, temperature 36.8 °C (98.3 °F), height 1.524 m (5'), weight 80.3 kg (177 lb).  General: Normal appearing. No distress.  An examination was performed including the scalp( including the hair) , head and face, inspection of eyelids, lips , right and left ear externally but not ear canals.  Neck and chest and back.  Including  Both upper and lower extremities, including hands and feet and nails and between fingers and toes.     All areas were found to be within normal limits except as noted in the description below.     Right ear right ear helix a 3-4 mm dry scaly lesion is noted, inner ear a dry scaly lesion  is noted  Face: Both cheeks with raised erythematous pustules consistent with rosacea.  Anterior neck with well-healed surgical incision consistent with previous cervical spine fusion.  Upper back with multiple cherry angioma and seborrheic keratosis.  Patient indicates that it is itchy and has an excoriated lesion that appears to be an excoriated seborrheic keratosis.  Lower back with a well-healed surgical incision consistent with her history of lumbar fusion in the past.    Patient wishes to have treatment with liquid nitrogen to the itchy seborrheic keratosis on her mid back she is aware liquid nitrogen can cause blistering erythema hypo-or hyperpigmentation.  Seborrheic keratosis is treated with liquid nitrogen.  The actinic keratosis on her right ear is treated with liquid nitrogen.  She consents to biopsy of the lesion on her right ear.  Consent is signed     Preoperative diagnosis: Neoplasm of uncertain behavior of the skin of the ear  Postoperative diagnosis: Neoplasm of uncertain behavior of the skin of the ear          Anesthesia: One quarter cc of lidocaine with epinephrine    EBL: Less than 2 cc    Complications: None    Procedure: After informed consent patient was placed on the table supine. An area 2 cm superior lateral to the skin neoplasm was swabbed with iodine. Using a 30 gauge 1/2 inch needle local anesthesia was obtained with half cc of lidocaine with epinephrine.  Using a #10 scalpel a shave biopsy was performed, lesion was sent to pathology. Aluminum chloride was used to achieve homeostasis.   Patient tolerated the procedure well . Antibacterial ointment/Aquaphor and a sterile dressing was applied, patient was instructed on wound care. Patient was instructed on signs and symptoms of infection. She will call the office if these occur.                    Please note that this dictation was created using voice recognition software. I have made every reasonable attempt to correct obvious errors,  but I expect that there are errors of grammar and possibly content that I did not discover before finalizing the note.      Assessment/Plan  1. 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  Pathology Specimen further treatment based on pathology results   2. Rosacea   patient is given a prescription for metronidazole gel to try she is advised to try mild soap for washing her face   3. Actinic keratoses   we discussed these are precancerous lesions that need to be monitored and treated at regular intervals she will follow-up in 6 months   4.  Seborrheic keratosis we discussed these are benign persistent lesions require no treatment.  They can become itchy at times.  5.  Cherry angioma.  We discussed these are persistent benign lesions requiring no treatment.      She is encouraged to wear sunscreen daily sun protective clothing and hats.  She will return for follow-up in 6 months sooner if she notices.

## 2019-01-28 NOTE — ASSESSMENT & PLAN NOTE
She has redness and pustules on her cheeks for about two years. She has not tried any medication for this.

## 2019-01-29 ENCOUNTER — OFFICE VISIT (OUTPATIENT)
Dept: NEUROLOGY | Facility: MEDICAL CENTER | Age: 74
End: 2019-01-29
Payer: MEDICARE

## 2019-01-29 ENCOUNTER — HOSPITAL ENCOUNTER (OUTPATIENT)
Dept: LAB | Facility: MEDICAL CENTER | Age: 74
End: 2019-01-29
Attending: FAMILY MEDICINE
Payer: MEDICARE

## 2019-01-29 VITALS
BODY MASS INDEX: 35.73 KG/M2 | HEART RATE: 64 BPM | DIASTOLIC BLOOD PRESSURE: 70 MMHG | SYSTOLIC BLOOD PRESSURE: 110 MMHG | RESPIRATION RATE: 16 BRPM | OXYGEN SATURATION: 95 % | WEIGHT: 182 LBS | HEIGHT: 60 IN | TEMPERATURE: 97.4 F

## 2019-01-29 DIAGNOSIS — F41.9 ANXIETY: ICD-10-CM

## 2019-01-29 DIAGNOSIS — G44.219 EPISODIC TENSION-TYPE HEADACHE, NOT INTRACTABLE: ICD-10-CM

## 2019-01-29 LAB
ALBUMIN SERPL BCP-MCNC: 4.1 G/DL (ref 3.2–4.9)
ALBUMIN/GLOB SERPL: 1.6 G/DL
ALP SERPL-CCNC: 95 U/L (ref 30–99)
ALT SERPL-CCNC: 16 U/L (ref 2–50)
ANION GAP SERPL CALC-SCNC: 8 MMOL/L (ref 0–11.9)
AST SERPL-CCNC: 18 U/L (ref 12–45)
BASOPHILS # BLD AUTO: 0.5 % (ref 0–1.8)
BASOPHILS # BLD: 0.04 K/UL (ref 0–0.12)
BILIRUB SERPL-MCNC: 0.4 MG/DL (ref 0.1–1.5)
BUN SERPL-MCNC: 17 MG/DL (ref 8–22)
CALCIUM SERPL-MCNC: 8.8 MG/DL (ref 8.5–10.5)
CHLORIDE SERPL-SCNC: 103 MMOL/L (ref 96–112)
CHOLEST SERPL-MCNC: 205 MG/DL (ref 100–199)
CO2 SERPL-SCNC: 26 MMOL/L (ref 20–33)
CREAT SERPL-MCNC: 1.03 MG/DL (ref 0.5–1.4)
EOSINOPHIL # BLD AUTO: 0.12 K/UL (ref 0–0.51)
EOSINOPHIL NFR BLD: 1.5 % (ref 0–6.9)
ERYTHROCYTE [DISTWIDTH] IN BLOOD BY AUTOMATED COUNT: 45.6 FL (ref 35.9–50)
GLOBULIN SER CALC-MCNC: 2.6 G/DL (ref 1.9–3.5)
GLUCOSE SERPL-MCNC: 82 MG/DL (ref 65–99)
HCT VFR BLD AUTO: 39.4 % (ref 37–47)
HDLC SERPL-MCNC: 43 MG/DL
HGB BLD-MCNC: 12.6 G/DL (ref 12–16)
IMM GRANULOCYTES # BLD AUTO: 0.02 K/UL (ref 0–0.11)
IMM GRANULOCYTES NFR BLD AUTO: 0.3 % (ref 0–0.9)
LDLC SERPL CALC-MCNC: 138 MG/DL
LYMPHOCYTES # BLD AUTO: 3.18 K/UL (ref 1–4.8)
LYMPHOCYTES NFR BLD: 40 % (ref 22–41)
MCH RBC QN AUTO: 29 PG (ref 27–33)
MCHC RBC AUTO-ENTMCNC: 32 G/DL (ref 33.6–35)
MCV RBC AUTO: 90.8 FL (ref 81.4–97.8)
MONOCYTES # BLD AUTO: 0.84 K/UL (ref 0–0.85)
MONOCYTES NFR BLD AUTO: 10.6 % (ref 0–13.4)
NEUTROPHILS # BLD AUTO: 3.75 K/UL (ref 2–7.15)
NEUTROPHILS NFR BLD: 47.1 % (ref 44–72)
NRBC # BLD AUTO: 0 K/UL
NRBC BLD-RTO: 0 /100 WBC
PATHOLOGY CONSULT NOTE: NORMAL
PLATELET # BLD AUTO: 293 K/UL (ref 164–446)
PMV BLD AUTO: 11.3 FL (ref 9–12.9)
POTASSIUM SERPL-SCNC: 4.3 MMOL/L (ref 3.6–5.5)
PROT SERPL-MCNC: 6.7 G/DL (ref 6–8.2)
RBC # BLD AUTO: 4.34 M/UL (ref 4.2–5.4)
SODIUM SERPL-SCNC: 137 MMOL/L (ref 135–145)
TRIGL SERPL-MCNC: 118 MG/DL (ref 0–149)
WBC # BLD AUTO: 8 K/UL (ref 4.8–10.8)

## 2019-01-29 PROCEDURE — 85025 COMPLETE CBC W/AUTO DIFF WBC: CPT

## 2019-01-29 PROCEDURE — 80053 COMPREHEN METABOLIC PANEL: CPT

## 2019-01-29 PROCEDURE — 80061 LIPID PANEL: CPT

## 2019-01-29 PROCEDURE — 99213 OFFICE O/P EST LOW 20 MIN: CPT | Performed by: NURSE PRACTITIONER

## 2019-01-29 PROCEDURE — 36415 COLL VENOUS BLD VENIPUNCTURE: CPT

## 2019-01-29 RX ORDER — ROPINIROLE 1 MG/1
TABLET, FILM COATED ORAL
COMMUNITY
Start: 2018-11-07 | End: 2022-02-14 | Stop reason: SDUPTHER

## 2019-01-29 ASSESSMENT — ENCOUNTER SYMPTOMS
DEPRESSION: 1
NECK PAIN: 1
COUGH: 0
SORE THROAT: 0
BACK PAIN: 1
NAUSEA: 0
ABDOMINAL PAIN: 0
DIARRHEA: 0
VOMITING: 0
DOUBLE VISION: 0
NERVOUS/ANXIOUS: 0
HEADACHES: 0

## 2019-01-29 ASSESSMENT — PATIENT HEALTH QUESTIONNAIRE - PHQ9: CLINICAL INTERPRETATION OF PHQ2 SCORE: 0

## 2019-01-29 NOTE — PROGRESS NOTES
"Subjective:      Jessica Abad is a 73 y.o. female who presents with Follow-Up (Headache, neck pain)            HPI  She was last seen about 18 months ago.      Has an x-ray to be done per PCP.  She is feeling a popping and movement in her right neck-- as if there is real \"hard metal\".  Feels like she shouldn't move her neck.    Headaches:  Occurring infrequent.  Sometimes they are intense enough to make her nauseated.    She did fall in October while in a haunted house and tripped over a character that crawled out from under a table.  She still feels uncomfortable with her left knee, still can't kneel on it.    Discussed that her mother is end-stage metastatic breast cancer.    Current Outpatient Prescriptions   Medication Sig Dispense Refill   • ROPINIRole (REQUIP) 1 MG Tab      • lisinopril (PRINIVIL) 20 MG Tab      • tizanidine (ZANAFLEX) 4 MG Tab Take 4 mg by mouth every 6 hours as needed.     • Coenzyme Q10 100 MG Cap Take 1 Cap by mouth every day.     • ascorbic acid (ASCORBIC ACID) 500 MG Tab Take 1,000 mg by mouth 2 Times a Day.     • Probiotic Product (PROBIOTIC DAILY PO) Take 1 Cap by mouth every day.     • zolpidem (AMBIEN) 10 MG Tab Take 1 Tab by mouth at bedtime as needed for Sleep. 90 Tab 1   • propranolol (INDERAL) 40 MG Tab Take 1 Tab by mouth 2 times a day. (Patient taking differently: Take 40 mg by mouth 2 times a day. TAKES FOR TREMORS) 60 Tab 11   • diphenhydrAMINE (BENADRYL) 50 MG Cap Take 1 Cap by mouth 3 times a day. 90 Cap 5   • clonazepam (KLONOPIN) 1 MG Tab Take 2 Tabs by mouth every bedtime. 60 Tab 5   • Non Formulary Request Take 1 Tab by mouth 3 times a day. **BETA TCD** for gallbladder     • Non Formulary Request Take 30 Drops by mouth 3 times a day. **SUPER PHOSPHATE LIQ**     • Non Formulary Request Take 1 Tab by mouth every bedtime. **MAGNESIUM AND POTASSIUM ASPARTATE**     • CRANBERRY PO Take 3 Tabs by mouth 2 Times a Day.     • Non Formulary Request Take 1 Tab by mouth 3 " times a day. **LIVOTRIT PLUS**     • gabapentin (NEURONTIN) 800 MG tablet Take 1 Tab by mouth 4 times a day. 120 Tab 5   • ibuprofen (MOTRIN) 800 MG TABS Take 1 Tab by mouth every 8 hours as needed for Mild Pain. WITH FOOD 90 Each 3   • Cholecalciferol (VITAMIN D) 2000 UNITS CAPS Take 1 Cap by mouth every day.       • metronidazole (METROGEL) 0.75 % gel Apply 1 g to affected area(s) 2 times a day. (Patient not taking: Reported on 1/29/2019) 1 Tube 1   • ropinirole (REQUIP) 2 MG tablet Take 2 mg by mouth every evening.     • tramadol (ULTRAM) 50 MG TABS Take 50 mg by mouth 3 times a day.     • cyclobenzaprine (FLEXERIL) 10 MG TABS Take 1 Tab by mouth 3 times a day as needed. (Patient not taking: Reported on 1/29/2019) 30 Tab 0     No current facility-administered medications for this visit.            Review of Systems   Constitutional: Positive for malaise/fatigue.   HENT: Negative for hearing loss, nosebleeds and sore throat.         No recent head injury.   Eyes: Negative for double vision.        No new loss of vision.   Respiratory: Negative for cough.         No recent lung infections.   Cardiovascular: Negative for chest pain.   Gastrointestinal: Negative for abdominal pain, diarrhea, nausea and vomiting.   Genitourinary: Negative.    Musculoskeletal: Positive for back pain, joint pain and neck pain.   Skin: Negative.    Neurological: Negative for headaches.   Endo/Heme/Allergies:        No history of endocrine dysfunction.  No new problems.   Psychiatric/Behavioral: Positive for depression. The patient is not nervous/anxious.         No recent mood changes.          Objective:     /70 (BP Location: Left arm, Patient Position: Sitting, BP Cuff Size: Adult)   Pulse 64   Temp 36.3 °C (97.4 °F) (Temporal)   Resp 16   Ht 1.524 m (5')   Wt 82.6 kg (182 lb)   SpO2 95%   BMI 35.54 kg/m²      Physical Exam   Constitutional: She is oriented to person, place, and time. She appears well-developed and  well-nourished.   HENT:   Head: Normocephalic.   Eyes: EOM are normal.   Neck: Normal range of motion.   Cardiovascular: Normal rate and regular rhythm.    Pulmonary/Chest: Effort normal.   Neurological: She is alert and oriented to person, place, and time. She exhibits normal muscle tone. Gait normal.   No observable changes in neurologic status.  See initial new patient examination for details.    Skin: Skin is warm.   Psychiatric: Her mood appears anxious. Her speech is tangential.   loquacious                Assessment/Plan:     Encouraged to continue with PCP for continuum of care.    We can re-evaluate her headaches in the future but she states clearly that they seem to be associated to her neck pain and current neck discomfort.    Consider a serial Brain MRI to be performed if/when a c-spine MRI.    Return for follow-up prn.

## 2019-01-30 NOTE — PROGRESS NOTES
Message for patient to call at home and cell regarding result showing actinic keratosis.   CARLITOS Lala.

## 2019-01-31 NOTE — PROGRESS NOTES
We discussed biopsy showed actinic keratosis . She will schedule a follow up appointment to check her skin. She states the biopsy site is healing well with no redness or swelling or pain.  Thank you   Berenice Bingham RN, APN

## 2019-02-07 ENCOUNTER — HOSPITAL ENCOUNTER (OUTPATIENT)
Dept: RADIOLOGY | Facility: MEDICAL CENTER | Age: 74
End: 2019-02-07
Attending: PAIN MEDICINE
Payer: MEDICARE

## 2019-02-07 DIAGNOSIS — M54.2 CERVICALGIA: ICD-10-CM

## 2019-02-07 PROCEDURE — 72050 X-RAY EXAM NECK SPINE 4/5VWS: CPT

## 2019-03-04 ENCOUNTER — TELEPHONE (OUTPATIENT)
Dept: NEUROLOGY | Facility: MEDICAL CENTER | Age: 74
End: 2019-03-04

## 2019-03-04 NOTE — TELEPHONE ENCOUNTER
Spoke with pt and she let me know if Gilma can review her xray of her throat and see if she needs to do her MRI with neck, pt also stated that she will need to be sedated during MRI procedure. Please advise thank you

## 2019-03-05 NOTE — TELEPHONE ENCOUNTER
I have reviewed her C-spine xrays.  I recommend that she further discuss with spine surgery.  Everything looks stable.

## 2019-03-12 ENCOUNTER — TELEPHONE (OUTPATIENT)
Dept: NEUROLOGY | Facility: MEDICAL CENTER | Age: 74
End: 2019-03-12

## 2019-03-12 NOTE — TELEPHONE ENCOUNTER
Per Samm, [employer] representative:  Hello Ladies, this pt stated she needed an order for a MRI, she stated Imaging did not have one for her from us      Thanks  Samm        Please see my last note:  Encouraged to continue with PCP for continuum of care.     We can re-evaluate her headaches in the future but she states clearly that they seem to be associated to her neck pain and current neck discomfort.     Consider a serial Brain MRI to be performed if/when a c-spine MRI.     Return for follow-up prn.

## 2019-03-14 ENCOUNTER — TELEPHONE (OUTPATIENT)
Dept: SCHEDULING | Facility: IMAGING CENTER | Age: 74
End: 2019-03-14

## 2019-03-14 NOTE — TELEPHONE ENCOUNTER
"Spoke with pt and advised her on documentation below, pt stated she would like to schedule her MRI but nothing has been ordered yet (also seen in pt's past imaging in chart). Pt states every time she turns her head right or left she feels something \"poking\" out of her neck so she would really like to have MRI neck done to get reassurance.   "

## 2019-03-15 ENCOUNTER — TELEPHONE (OUTPATIENT)
Dept: NEUROLOGY | Facility: MEDICAL CENTER | Age: 74
End: 2019-03-15

## 2019-03-15 DIAGNOSIS — M54.2 CERVICALGIA: ICD-10-CM

## 2019-03-15 NOTE — TELEPHONE ENCOUNTER
Message was taking from Katie, pt was very frustrated and felt like she was not being heard and understood by Tanya.     Patient called and stated she is suppose to be getting a MRI of her brain. She had a throat xray and was told to follow up with her spine specialist. Patient stated her spine specialist told her to get a MRI of her neck and to have Albertina to order it.     Please advise.

## 2019-03-18 NOTE — TELEPHONE ENCOUNTER
Alrighty, the neck x-ray needed to be done prior to the c-spine MRI to confirm it was necessary.  I apologize for the miscommunication.    A c-spine MRI has been ordered.

## 2019-04-03 ENCOUNTER — TELEPHONE (OUTPATIENT)
Dept: NEUROLOGY | Facility: MEDICAL CENTER | Age: 74
End: 2019-04-03

## 2019-04-03 ENCOUNTER — OFFICE VISIT (OUTPATIENT)
Dept: MEDICAL GROUP | Facility: MEDICAL CENTER | Age: 74
End: 2019-04-03
Payer: MEDICARE

## 2019-04-03 VITALS
RESPIRATION RATE: 16 BRPM | DIASTOLIC BLOOD PRESSURE: 78 MMHG | HEART RATE: 73 BPM | BODY MASS INDEX: 35.93 KG/M2 | TEMPERATURE: 97.9 F | WEIGHT: 183 LBS | SYSTOLIC BLOOD PRESSURE: 102 MMHG | OXYGEN SATURATION: 95 % | HEIGHT: 60 IN

## 2019-04-03 DIAGNOSIS — G25.81 RLS (RESTLESS LEGS SYNDROME): Chronic | ICD-10-CM

## 2019-04-03 DIAGNOSIS — N18.30 CKD (CHRONIC KIDNEY DISEASE) STAGE 3, GFR 30-59 ML/MIN (HCC): ICD-10-CM

## 2019-04-03 DIAGNOSIS — D36.10 DYSPLASTIC CEREBELLAR GANGLIOCYTOMA (HCC): Chronic | ICD-10-CM

## 2019-04-03 DIAGNOSIS — Z86.73 HISTORY OF STROKE: ICD-10-CM

## 2019-04-03 DIAGNOSIS — Q04.8 DYSPLASTIC CEREBELLAR GANGLIOCYTOMA (HCC): ICD-10-CM

## 2019-04-03 DIAGNOSIS — M54.2 NECK PAIN: ICD-10-CM

## 2019-04-03 DIAGNOSIS — I63.9 CEREBRAL INFARCTION, UNSPECIFIED MECHANISM (HCC): ICD-10-CM

## 2019-04-03 DIAGNOSIS — F41.1 GAD (GENERALIZED ANXIETY DISORDER): ICD-10-CM

## 2019-04-03 DIAGNOSIS — D36.10 DYSPLASTIC CEREBELLAR GANGLIOCYTOMA (HCC): ICD-10-CM

## 2019-04-03 DIAGNOSIS — Q04.8 DYSPLASTIC CEREBELLAR GANGLIOCYTOMA (HCC): Chronic | ICD-10-CM

## 2019-04-03 DIAGNOSIS — M79.2 NERVE PAIN: ICD-10-CM

## 2019-04-03 DIAGNOSIS — E66.9 OBESITY (BMI 35.0-39.9 WITHOUT COMORBIDITY): ICD-10-CM

## 2019-04-03 DIAGNOSIS — I10 ESSENTIAL HYPERTENSION: Chronic | ICD-10-CM

## 2019-04-03 DIAGNOSIS — E78.5 DYSLIPIDEMIA: ICD-10-CM

## 2019-04-03 DIAGNOSIS — G47.00 INSOMNIA, UNSPECIFIED TYPE: ICD-10-CM

## 2019-04-03 DIAGNOSIS — M79.7 FIBROMYALGIA: Chronic | ICD-10-CM

## 2019-04-03 PROCEDURE — 8041 PR SCP AHA: Performed by: NURSE PRACTITIONER

## 2019-04-03 PROCEDURE — 99203 OFFICE O/P NEW LOW 30 MIN: CPT | Performed by: NURSE PRACTITIONER

## 2019-04-03 RX ORDER — DULOXETIN HYDROCHLORIDE 30 MG/1
30 CAPSULE, DELAYED RELEASE ORAL DAILY
Qty: 30 CAP | Refills: 1 | Status: SHIPPED | OUTPATIENT
Start: 2019-04-03 | End: 2020-10-22

## 2019-04-03 RX ORDER — PAROXETINE 10 MG/1
10 TABLET, FILM COATED ORAL DAILY
Qty: 30 TAB | Refills: 1 | Status: SHIPPED | OUTPATIENT
Start: 2019-04-03 | End: 2019-04-03

## 2019-04-03 NOTE — TELEPHONE ENCOUNTER
Order for MRI cervical spine already ordered.     Pt called and is asking for the MRI of the brain as well, she states she gets it done yearly (last one done 03/12/18) would you like her to do another one?    Please advise.

## 2019-04-03 NOTE — LETTER
[facility]  HAMILTON ShenP.RArelyN.  [address]  [place] [address]  Fax: [phone]   Authorization for Release/Disclosure of   Protected Health Information   Name: LISA AIKEN : 1945 SSN: [id number]   Address: [facility] [address]  [place] [address] Phone:    [phone] (home)    I authorize the entity listed below to release/disclose the PHI below to:   [facility]/MARIA ELENA Shen.P.R.DENISSE. and HAMILTON ShenP.RGIORGIO.   Provider or Entity Name:  Dr. Rich   Address   City, [facility]   Phone:      Fax:     Reason for request: continuity of care   Information to be released:    [  ] LAST COLONOSCOPY,  including any PATH REPORT and follow-up  [  ] LAST FIT/COLOGUARD RESULT [  ] LAST DEXA  [  ] LAST MAMMOGRAM  [  ] LAST PAP  [  ] LAST LABS [  ] RETINA EXAM REPORT  [  ] IMMUNIZATION RECORDS  [x  ] Release all info      [  ] Check here and initial the line next to each item to release ALL health information INCLUDING  _____ Care and treatment for drug and / or alcohol abuse  _____ HIV testing, infection status, or AIDS  _____ Genetic Testing    DATES OF SERVICE OR TIME PERIOD TO BE DISCLOSED: _____________  I understand and acknowledge that:  * This Authorization may be revoked at any time by you in writing, except if your health information has already been used or disclosed.  * Your health information that will be used or disclosed as a result of you signing this authorization could be re-disclosed by the recipient. If this occurs, your re-disclosed health information may no longer be protected by State or Federal laws.  * You may refuse to sign this Authorization. Your refusal will not affect your ability to obtain treatment.  * This Authorization becomes effective upon signing and will  on (date) __________.      If no date is indicated, this Authorization will  one (1) year from the signature date.    Name: Lisa Schultz  Jenniffer    Signature:   Date:     4/3/2019       PLEASE FAX REQUESTED RECORDS BACK TO: [phone]

## 2019-04-03 NOTE — PROGRESS NOTES
"CC: new patient-establish care      Jessica Abad is a 73 y.o. female here to establish care and to discuss the evaluation and management of:    Here to establish care former patient of Dr. Rich.    Blood pressure  Patient takes her lisinopril for this.  Denies any chest pain, shortness of breath or dizziness.    Chronic kidney disease  Last GFR was back in January was 53.    Fibromyalgia  Patient states she is been diagnosed with fibromyalgia.      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 Takes gabapentin for this.     Neck pain  Has had surgery in the past. States it has \"slipped\" and will be having an MRI for this.     Insomnia  Takes Ambien 10mg for this. States she has been taking this for quite some time.    Tremors  Takes propranolol for this. States this is helpful.    Anxiety  Takes clonazepam 2 mg at bedtime.  Patient states at one point she was taking up to 8 mg a day in the past.  States that she would like to try to wean off of this medication as she knows that this is a very addictive medication and it can be difficult to wean off of.  She has not been on any SSRIs in the past.       Tinnitus  Worse at night. States that it was from taking quinine in the past.    Restless legs  Takes ropinirole for this.     ROS:  Denies any Headache, Blurred Vision, Confusion, Chest pain,  Shortness of breath,  Abdominal pain, Changes of bowel or bladder, Hematuria, Hematochezia, Lower ext. edema, Fevers, Nights sweats, Weight Changes, Focal weakness or numbness.  And all other systems are negative.      Current Outpatient Prescriptions:   •  DULoxetine (CYMBALTA) 30 MG Cap DR Particles, Take 1 Cap by mouth every day., Disp: 30 Cap, Rfl: 1  • "  ROPINIRole (REQUIP) 1 MG Tab, , Disp: , Rfl:   •  lisinopril (PRINIVIL) 20 MG Tab, , Disp: , Rfl:   •  tizanidine (ZANAFLEX) 4 MG Tab, Take 4 mg by mouth every 6 hours as needed., Disp: , Rfl:   •  Coenzyme Q10 100 MG Cap, Take 1 Cap by mouth every day., Disp: , Rfl:   •  ascorbic acid (ASCORBIC ACID) 500 MG Tab, Take 1,000 mg by mouth 2 Times a Day., Disp: , Rfl:   •  Probiotic Product (PROBIOTIC DAILY PO), Take 1 Cap by mouth every day., Disp: , Rfl:   •  zolpidem (AMBIEN) 10 MG Tab, Take 1 Tab by mouth at bedtime as needed for Sleep., Disp: 90 Tab, Rfl: 1  •  propranolol (INDERAL) 40 MG Tab, Take 1 Tab by mouth 2 times a day. (Patient taking differently: Take 40 mg by mouth 2 times a day. TAKES FOR TREMORS), Disp: 60 Tab, Rfl: 11  •  diphenhydrAMINE (BENADRYL) 50 MG Cap, Take 1 Cap by mouth 3 times a day., Disp: 90 Cap, Rfl: 5  •  clonazepam (KLONOPIN) 1 MG Tab, Take 2 Tabs by mouth every bedtime., Disp: 60 Tab, Rfl: 5  •  CRANBERRY PO, Take 3 Tabs by mouth 2 Times a Day., Disp: , Rfl:   •  gabapentin (NEURONTIN) 800 MG tablet, Take 1 Tab by mouth 4 times a day., Disp: 120 Tab, Rfl: 5  •  Cholecalciferol (VITAMIN D) 2000 UNITS CAPS, Take 1 Cap by mouth every day.  , Disp: , Rfl:   •  Non Formulary Request, Take 1 Tab by mouth 3 times a day. **BETA TCD** for gallbladder, Disp: , Rfl:   •  Non Formulary Request, Take 30 Drops by mouth 3 times a day. **SUPER PHOSPHATE LIQ**, Disp: , Rfl:   •  Non Formulary Request, Take 1 Tab by mouth every bedtime. **MAGNESIUM AND POTASSIUM ASPARTATE**, Disp: , Rfl:   •  Non Formulary Request, Take 1 Tab by mouth 3 times a day. **LIVOTRIT PLUS**, Disp: , Rfl:   •  ibuprofen (MOTRIN) 800 MG TABS, Take 1 Tab by mouth every 8 hours as needed for Mild Pain. WITH FOOD (Patient not taking: Reported on 4/3/2019), Disp: 90 Each, Rfl: 3    Allergies   Allergen Reactions   • Nsaids Hives, Rash and Itching   • Asa [Aspirin]      BAD ACID REFLUX   • Codeine    • Morphine    • Sulfa Drugs   "      Past Medical History:   Diagnosis Date   • Actinic keratitis 1/28/2019   • Actinic keratoses 1/28/2019   • Anxiety    • Breath shortness     slightly short of breath with exertion   • CVA (cerebral infarction) 2004    STATES THIS HAS AFFECTED HER WRITING.   USED TO BE AN ARTIST. ALSO HAD SHORT TERM MEMORY LOSS.   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 at SURGERY [facility] ORS   • NEURO DEST FACET L/S W/IG SNGL  8/7/2014    Performed by AHMET FelixO. at [facility] ORS   • NEURO DEST FACET L/S W/IG ADDL  8/7/2014    Performed by Raf Sutherland D.O. at [facility] ORS   • NEURO DEST FACET L/S W/IG ADDL  8/7/2014    Performed by AHMET FelixO. at [facility] ORS   • NEURO DEST FACET L/S W/IG ADDL  8/7/2014    Performed by Raf Sutherland D.O. at [facility] ORS   • PB INJ DX/THER AGNT PARAVERT FACET JOINT, KATRIN*  11/14/2011    Performed by LIZZETH MERRILL at [facility] ORS   • PB INJ DX/THER AGNT PARAVERT FACET JOINT, CE*  11/14/2011    Performed by LIZZETH MERRILL at [facility] ORS   • ABDOMINAL HYSTERECTOMY TOTAL     • KNEE REPLACEMENT, TOTAL Right    • OTHER ORTHOPEDIC SURGERY      NECK, BACK     Family History   Problem Relation Age of Onset   • Heart Disease Father         MI AT 42 Y/0   • " Hypertension Father    • Stroke Mother    • Breast Cancer Mother    • Diabetes Maternal Grandfather      Social History     Social History   • Marital status: Legally      Spouse name: N/A   • Number of children: N/A   • Years of education: N/A     Occupational History   • Not on file.     Social History Main Topics   • Smoking status: Never Smoker   • Smokeless tobacco: Never Used   • Alcohol use No   • Drug use: No   • Sexual activity: Not on file     Other Topics Concern   • Not on file     Social History Narrative   • No narrative on file       Objective:     Vitals: /78   Pulse 73   Temp 36.6 °C (97.9 °F)   Resp 16   Ht 1.524 m (5')   Wt 83 kg (183 lb)   SpO2 95%   BMI 35.74 kg/m²      General: Alert, pleasant, NAD  HEENT:  Normocephalic.   Neck supple.  No thyromegaly or masses palpated. No cervical or supraclavicular lymphadenopathy.  Heart:  Regular rate and rhythm.  S1 and S2 normal.  No murmurs appreciated.    Respiratory:  Normal respiratory effort.  Clear to auscultation bilaterally.    Skin:  Warm, dry, no rashes  Musculoskeletal:  Gait is normal.  Moves all extremities well.  Extremities:  No leg edema.  Neurological: No tremors, sensation grossly intact,   Psych:  Affect/mood is normal, judgement is good, memory is intact, grooming is appropriate.      Assessment and Plan.   73 y.o. female to establish care and discuss the following    1. Essential hypertension  Stable.  Tolerating lisinopril without any dizziness, cough, leg swelling, chest pain.  Continue current regimen.  - CBC WITHOUT DIFFERENTIAL; Future  - Comp Metabolic Panel; Future  - TSH WITH REFLEX TO FT4; Future  - MICROALBUMIN CREAT RATIO URINE; Future    2. JIMMY (generalized anxiety disorder)  Chronic.  Have referred patient over to psychiatry to discuss weaning off of clonazepam and possibly starting an SSRI for her anxiety.  Have discussed with patient that may be the start on Cymbalta as this may help with her  "fibromyalgia as well.    - REFERRAL TO PSYCHIATRY  - DULoxetine (CYMBALTA) 30 MG Cap DR Particles; Take 1 Cap by mouth every day.  Dispense: 30 Cap; Refill: 1    3. Fibromyalgia  Chronic.  Takes Cymbalta for this.  - VITAMIN B12; Future    4. Dysplastic cerebellar gangliocytoma (HCC)  Followed by neurology for this.    5. CKD (chronic kidney disease) stage 3, GFR 30-59 ml/min (HCC)  Chronic.  Last GFR was 53 back in January.  A encourage patient to avoid NSAIDs.    6. RLS (restless legs syndrome)  Chronic.  Controlled with Requip.    7. Dyslipidemia  Not on any medications at this time.  Last labs January 2019 with cholesterol of 205, try glycerides 118, HDL 43, .  - Lipid Profile; Future    8. History of stroke  Patient states she has a history of a stroke.  Need to review records.  Requesting records.    9. Insomnia, unspecified type  Chronic.  Patient states his been taking Ambien 10 mg.  Have discussed with patient that I would prescribe 5 mg of Ambien in lieu of the 10 mg based on her age and her sex.  Also have discussed weaning off of the clonazepam as it is not recommended to take both these medications at the same time.  Patient states she understands this and is willing to come off of the clonazepam.    10. Neck pain  Patient has a history of having a C3-C7 fusions.  Last imaging from February 2019 shows degenerative disc disease, no instability, no dislocation.  Slight C7 anterior listhesis.    11. Nerve pain  Patient states she has had \"nerve seizures that \"she takes gabapentin for this.  We will be requesting records.    12. Obesity (BMI 35.0-39.9 without comorbidity)  Chronic. Patient encouraged to reduce excess calorie consumption. Encouraged regular exercise. Discussed long term sequelae of obesity.  - Patient identified as having weight management issue.  Appropriate orders and counseling given.        Return in about 4 weeks (around 5/1/2019).          Amy Mead " A.P.R.N.      Annual Health Assessment Questions:    1.  Are you currently engaging in any exercise or physical activity? Yes    2.  How would you describe your mood or emotional well-being today? fair    3.  Have you had any falls in the last year? No    4.  Have you noticed any problems with your balance or had difficulty walking? Yes    5.  In the last six months have you experienced any leakage of urine? Yes    6. DPA/Advanced Directive: Patient has Living Will, but it is not on file. Instructed to bring in a copy to scan into their chart.

## 2019-04-09 ENCOUNTER — PATIENT MESSAGE (OUTPATIENT)
Dept: NEUROLOGY | Facility: MEDICAL CENTER | Age: 74
End: 2019-04-09

## 2019-04-10 ENCOUNTER — TELEPHONE (OUTPATIENT)
Dept: NEUROLOGY | Facility: MEDICAL CENTER | Age: 74
End: 2019-04-10

## 2019-04-10 DIAGNOSIS — I63.9 CEREBRAL INFARCTION, UNSPECIFIED MECHANISM (HCC): ICD-10-CM

## 2019-04-10 DIAGNOSIS — Q04.8 DYSPLASTIC CEREBELLAR GANGLIOCYTOMA (HCC): Chronic | ICD-10-CM

## 2019-04-10 DIAGNOSIS — D36.10 DYSPLASTIC CEREBELLAR GANGLIOCYTOMA (HCC): Chronic | ICD-10-CM

## 2019-04-10 NOTE — TELEPHONE ENCOUNTER
From: Jessica Abad  To: SHAYE Lopez  Sent: 4/9/2019 12:58 PM PDT  Subject: Procedure Question    Martín Dillard,  As a follow-up to my message:  The latest MRI scheduling disappointment was this morning, Tuesday, April 9th when the  was going over the order with Sherry Bolivar's MRI's for [facility] years at Imaging have been done with an Anesthetist due to her Claustrophobia and her Tinnitus. This information was not on your order so the current status is the  is calling your office (again) to have the order re-written.    The latest MRI scheduling disappointment was this morning, Tuesday, April 9th when the  was going over the order with Sherry Lowerys MRI's for 17 years at Imaging have been done with an Anesthetist due to her Claustrophobia and her Tinnitus.

## 2019-04-10 NOTE — TELEPHONE ENCOUNTER
Alanis message from patient:       Martín Lala,   I am one of Katt Abad's care-givers..  You might or might not be aware, but just a heads up and an internal confidential  note  to you,  your medical assistant(s) have not been very functional for [facility] MRI needs as of late. One of them was bluntly very rude.     Katt has tried numerous times since her last appt. with you in March with at least (2) different medical assistants to simply schedule her yearly Brain Scan along with (this) order, a cervical because as she noted to you last visit, it is a serious issue to her on the plates  suspected movement (not unusual) and waiting this long for scan results has been very frustrating.     As a follow-up to my message:   The latest MRI scheduling disappointment was this morning, Tuesday, April 9th when the  was going over the order with Katt. Katt's MRI's for 17 years at Imaging have been done with an Anesthetist due to her Claustrophobia and her Tinnitus. This information was not on your order so  the current status is the  is calling your office (again) to have the order re-written      Please advise.

## 2019-04-12 NOTE — TELEPHONE ENCOUNTER
"New Brain MRI order is placed including anesthesia.    For all purposes for the future, when the MRI is being requested, it would behoove Jessica and others to state specifically \"with anesthesia for sedation\" as this is a very unique request.  "

## 2019-04-30 DIAGNOSIS — M79.2 POLYNEUROPATHIC PAIN: ICD-10-CM

## 2019-04-30 DIAGNOSIS — F51.01 PRIMARY INSOMNIA: ICD-10-CM

## 2019-04-30 DIAGNOSIS — R25.1 TREMORS OF NERVOUS SYSTEM: ICD-10-CM

## 2019-04-30 NOTE — TELEPHONE ENCOUNTER
Pt would like a 90 day supply?    Was the patient seen in the last year in this department? Yes    Does patient have an active prescription for medications requested? No     Received Request Via: Patient

## 2019-05-02 DIAGNOSIS — F51.01 PRIMARY INSOMNIA: ICD-10-CM

## 2019-05-02 RX ORDER — PROPRANOLOL HYDROCHLORIDE 40 MG/1
40 TABLET ORAL 2 TIMES DAILY
Qty: 200 TAB | Refills: 0 | Status: SHIPPED | OUTPATIENT
Start: 2019-05-02 | End: 2022-10-03 | Stop reason: SDUPTHER

## 2019-05-02 RX ORDER — ZOLPIDEM TARTRATE 5 MG/1
5 TABLET ORAL NIGHTLY PRN
Qty: 90 TAB | Refills: 0 | Status: SHIPPED | OUTPATIENT
Start: 2019-05-02 | End: 2019-07-31

## 2019-05-02 RX ORDER — GABAPENTIN 800 MG/1
800 TABLET ORAL 4 TIMES DAILY
Qty: 400 TAB | Refills: 0 | Status: SHIPPED | OUTPATIENT
Start: 2019-05-02 | End: 2022-09-22 | Stop reason: SDUPTHER

## 2019-05-02 RX ORDER — ZOLPIDEM TARTRATE 5 MG/1
5 TABLET ORAL NIGHTLY PRN
Qty: 30 TAB | Refills: 0 | Status: SHIPPED | OUTPATIENT
Start: 2019-05-02 | End: 2019-05-02 | Stop reason: SDUPTHER

## 2019-05-02 RX ORDER — TIZANIDINE 4 MG/1
4 TABLET ORAL EVERY 6 HOURS PRN
Qty: 90 TAB | Refills: 0 | Status: SHIPPED | OUTPATIENT
Start: 2019-05-02 | End: 2022-02-01 | Stop reason: SDUPTHER

## 2019-05-20 ENCOUNTER — TELEPHONE (OUTPATIENT)
Dept: NEUROLOGY | Facility: MEDICAL CENTER | Age: 74
End: 2019-05-20

## 2019-05-20 DIAGNOSIS — Z01.818 ENCOUNTER FOR PREADMISSION TESTING: ICD-10-CM

## 2019-05-20 DIAGNOSIS — Z01.89 ENCOUNTER FOR ROUTINE LABORATORY TESTING: ICD-10-CM

## 2019-05-20 NOTE — ADDENDUM NOTE
Addended by: STACIE SEALS on: 5/20/2019 11:06 AM     Modules accepted: Orders     Okay for Valium 5 mg take one tablet once approximately 30 minutes prior to MRI. Please remind him he cannot drive home and someone will need to drive him to and from his MRI appointment

## 2019-05-20 NOTE — TELEPHONE ENCOUNTER
Spoke to Imaging today and stated that they need updated H&P faxed to [phone] by end of today. Gilma has been notified.      Reece stated that all else looks good as the labs and the EKG have been ordered.

## 2019-05-20 NOTE — TELEPHONE ENCOUNTER
Patient is in need of an EKG order to be placed today to have done along with the lab work for the MRI scheduled on 05/22.    Please advise.

## 2019-05-21 NOTE — TELEPHONE ENCOUNTER
H&P:  Please see last note in chart.    Patient last seen for neurology follow-up on January 29, 2019.

## 2019-05-22 ENCOUNTER — APPOINTMENT (OUTPATIENT)
Dept: RADIOLOGY | Facility: MEDICAL CENTER | Age: 74
End: 2019-05-22
Attending: NURSE PRACTITIONER
Payer: MEDICARE

## 2019-05-22 ENCOUNTER — TELEPHONE (OUTPATIENT)
Dept: RADIOLOGY | Facility: MEDICAL CENTER | Age: 74
End: 2019-05-22

## 2019-06-04 DIAGNOSIS — J30.2 SEASONAL ALLERGIES: ICD-10-CM

## 2019-06-04 RX ORDER — DIPHENHYDRAMINE HCL 50 MG
50 CAPSULE ORAL 3 TIMES DAILY
Qty: 90 CAP | Refills: 5 | Status: SHIPPED | OUTPATIENT
Start: 2019-06-04 | End: 2023-08-08

## 2019-06-05 ENCOUNTER — TELEPHONE (OUTPATIENT)
Dept: NEUROLOGY | Facility: MEDICAL CENTER | Age: 74
End: 2019-06-05

## 2019-06-05 NOTE — TELEPHONE ENCOUNTER
1) Her MRI's were cancelled.    2) The requested creatinine level and EKG still need to be done.  Ideally they can be processed on Friday, 6/7/19 during her lab appointment.    3) Yaritza, please call radiology to clarify what exactly we need to complete to be successful for her anesthesia for MRI's.      We will need an appt for H&P to be completed based on the time frame in which her MRI's can be performed.

## 2019-06-14 ENCOUNTER — TELEPHONE (OUTPATIENT)
Dept: NEUROLOGY | Facility: MEDICAL CENTER | Age: 74
End: 2019-06-14

## 2019-06-14 NOTE — TELEPHONE ENCOUNTER
Left voicemail for pt 6/6. As of 6/14 had not heard back. SABRINA Abad contacted [facility] via email inquiring about MRI. I provided him with the following information via email.     In order for the MRI with sedation to be done, the following needs to occur:  1. H&P with updated exam done no more than 30 days before the MRI. The patient’s last visit with Gilma Lala was in January of 2019. Therefore, we need to get the patient in for an appointment. Gilma is available 6/17 at 8am. Please let me know if that date and time work for the patient.   2. EKG completed no more than 6 months before the MRI. The order for the EKG has been placed so the patient needs to call [phone] to schedule.   3. Lab work completed no more than 28 days before the MRI. The order for the lab work was also submitted.  [phone] is the number to schedule lab appointments.

## 2019-07-31 ENCOUNTER — TELEPHONE (OUTPATIENT)
Dept: MEDICAL GROUP | Facility: MEDICAL CENTER | Age: 74
End: 2019-07-31

## 2019-07-31 NOTE — TELEPHONE ENCOUNTER
1. Caller Name: Pharmacy                                         Call Back Number: [phone]      Patient approves a detailed voicemail message: N\A    Pharmacy left me a message informing us that the pt filled two Ambien Rx from us and Dr Rich on the month of July.  I called the Pharmacy and they stated that the pt told them that Dr. Rich is her new PCP. She saw him early July and he gave pt a 30 day supply of Ambien 10 mg. She filled it 2019    She has/ had an active script with us for 90 days 5 mg. It does  2019.

## 2019-07-31 NOTE — TELEPHONE ENCOUNTER
Verified the patient has in fact received 180 clonazepam 1 mg tablets from another provider and Ambien 10mg tablets.  It appears as if she is established back with her former PCP and he will be prescribing her medications therefore I will no longer be prescribing her any medications.

## 2019-08-02 NOTE — TELEPHONE ENCOUNTER
I spoke to pt and she verbally confirmed she establish with her former PCP but she stated that he dropped her. He dropped her as a patient due to her filling his Rx early.  I removed you as a PCP.

## 2019-11-13 ENCOUNTER — OFFICE VISIT (OUTPATIENT)
Dept: DERMATOLOGY | Facility: IMAGING CENTER | Age: 74
End: 2019-11-13
Payer: MEDICARE

## 2019-11-13 DIAGNOSIS — H61.001 CHONDRODERMATITIS NODULARIS HELICIS OF RIGHT EAR: ICD-10-CM

## 2019-11-13 DIAGNOSIS — L72.3 INFLAMED EPIDERMOID CYST OF SKIN: ICD-10-CM

## 2019-11-13 DIAGNOSIS — R20.8 SKIN PAIN: ICD-10-CM

## 2019-11-13 PROCEDURE — 99212 OFFICE O/P EST SF 10 MIN: CPT | Performed by: DERMATOLOGY

## 2019-11-13 ASSESSMENT — ENCOUNTER SYMPTOMS
CHILLS: 0
FEVER: 0

## 2019-11-13 NOTE — PROGRESS NOTES
"Dermatology Return Patient Visit    Chief Complaint   Patient presents with   • Follow-Up   • Actinic Keratosis       Subjective:     HPI:   Jessica Abad is a 73 y.o. female presenting for    Follow up  S/p bx AK on the right ear helix 01/2019, healed, but area is still sore     Growth on the forehead  Time present: 2.5 - 3 years  Painful lesion: yes, when becomes inflamed  Itching lesion: No  Enlarging lesion: Yes, is able to get fluid out of it occasionally  Anything make it better or worse? Unsure    HPI/location: spot on the right forearm  Time present: years  Painful lesion: No  Itching lesion: No  Enlarging lesion: no, but seemed to change color years ago  Anything make it better or worse? n/a      Past Medical History:   Diagnosis Date   • Actinic keratitis 1/28/2019   • Actinic keratoses 1/28/2019   • Anxiety    • Breath shortness     slightly short of breath with exertion   • CVA (cerebral infarction) 2004    STATES THIS HAS AFFECTED HER WRITING.   USED TO BE AN ARTIST. ALSO HAD SHORT TERM MEMORY LOSS.   • DDD (degenerative disc disease), lumbosacral    • Degenerative joint disease     cervical spine, right knee w/ right knee total knee replacement, lumbar spine   • DJD (degenerative joint disease)    • Dysplastic cerebellar gangliocytoma (HCC)    • Fibromyalgia    • Insomnia    • 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 1/28/2019   • Ovarian cyst, right 12/7/2011   • RLS (restless legs syndrome)    • Rosacea 1/28/2019   • Stroke (HCC) 2004    staes was from \"overdose of morphine\"   • Tinnitus    • Tremors of nervous system        Current Outpatient Medications on File Prior to Visit   Medication Sig Dispense Refill   • diphenhydrAMINE (BENADRYL) 50 MG Cap Take 1 Cap by mouth 3 times a day. 90 Cap 5   • gabapentin (NEURONTIN) 800 MG tablet Take 1 Tab by mouth 4 times a day. 400 Tab 0   • tizanidine (ZANAFLEX) 4 MG Tab Take 1 Tab by mouth every 6 hours as needed. 90 Tab 0   • propranolol " (INDERAL) 40 MG Tab Take 1 Tab by mouth 2 times a day. 200 Tab 0   • ROPINIRole (REQUIP) 1 MG Tab      • lisinopril (PRINIVIL) 20 MG Tab      • clonazepam (KLONOPIN) 1 MG Tab Take 2 Tabs by mouth every bedtime. 60 Tab 5   • DULoxetine (CYMBALTA) 30 MG Cap DR Particles Take 1 Cap by mouth every day. (Patient not taking: Reported on 11/13/2019) 30 Cap 1   • Coenzyme Q10 100 MG Cap Take 1 Cap by mouth every day.     • ascorbic acid (ASCORBIC ACID) 500 MG Tab Take 1,000 mg by mouth 2 Times a Day.     • Probiotic Product (PROBIOTIC DAILY PO) Take 1 Cap by mouth every day.     • Non Formulary Request Take 1 Tab by mouth 3 times a day. **BETA TCD** for gallbladder     • Non Formulary Request Take 30 Drops by mouth 3 times a day. **SUPER PHOSPHATE LIQ**     • Non Formulary Request Take 1 Tab by mouth every bedtime. **MAGNESIUM AND POTASSIUM ASPARTATE**     • CRANBERRY PO Take 3 Tabs by mouth 2 Times a Day.     • Non Formulary Request Take 1 Tab by mouth 3 times a day. **LIVOTRIT PLUS**     • ibuprofen (MOTRIN) 800 MG TABS Take 1 Tab by mouth every 8 hours as needed for Mild Pain. WITH FOOD (Patient not taking: Reported on 4/3/2019) 90 Each 3   • Cholecalciferol (VITAMIN D) 2000 UNITS CAPS Take 1 Cap by mouth every day.         No current facility-administered medications on file prior to visit.        Allergies   Allergen Reactions   • Nsaids Hives, Rash and Itching   • Asa [Aspirin]      BAD ACID REFLUX   • Codeine    • Morphine    • Sulfa Drugs        Family History   Problem Relation Age of Onset   • Heart Disease Father         MI AT 42 Y/0   • Hypertension Father    • Stroke Mother    • Breast Cancer Mother    • Diabetes Maternal Grandfather        Social History     Socioeconomic History   • Marital status: Legally      Spouse name: Not on file   • Number of children: Not on file   • Years of education: Not on file   • Highest education level: Not on file   Occupational History   • Not on file   Social Needs    • Financial resource strain: Not on file   • Food insecurity:     Worry: Not on file     Inability: Not on file   • Transportation needs:     Medical: Not on file     Non-medical: Not on file   Tobacco Use   • Smoking status: Never Smoker   • Smokeless tobacco: Never Used   Substance and Sexual Activity   • Alcohol use: No   • Drug use: No   • Sexual activity: Not on file   Lifestyle   • Physical activity:     Days per week: Not on file     Minutes per session: Not on file   • Stress: Not on file   Relationships   • Social connections:     Talks on phone: Not on file     Gets together: Not on file     Attends Nondenominational service: Not on file     Active member of club or organization: Not on file     Attends meetings of clubs or organizations: Not on file     Relationship status: Not on file   • Intimate partner violence:     Fear of current or ex partner: Not on file     Emotionally abused: Not on file     Physically abused: Not on file     Forced sexual activity: Not on file   Other Topics Concern   • Not on file   Social History Narrative   • Not on file       Review of Systems   Constitutional: Negative for chills and fever.   Skin: Negative for itching and rash.        Objective:     A focused cutaneous exam was completed including: hair, ears, face, eyelids, conjunctiva, lips, neck with the following pertinent findings listed below. Remaining above-listed examined areas within normal limits / negative for rashes or lesions.    There were no vitals taken for this visit.    Physical Exam   Constitutional: She is oriented to person, place, and time and well-developed, well-nourished, and in no distress.   HENT:   Head: Normocephalic and atraumatic.       Eyes: Conjunctivae and lids are normal.   Neck: Normal range of motion.   Pulmonary/Chest: Effort normal.   Neurological: She is alert and oriented to person, place, and time.   Skin: Skin is warm and dry.   Psychiatric: Mood and affect normal.     DATA: none  applicable to review    Assessment and Plan:     1. 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 Inflamed epidermoid cyst of skin, +skin pain  - patient to make appointment for procedure per her schedule    Followup: Return for procedure.    Aida Baron M.D.

## 2020-09-23 ENCOUNTER — TELEPHONE (OUTPATIENT)
Dept: NEUROLOGY | Facility: MEDICAL CENTER | Age: 75
End: 2020-09-23

## 2020-09-23 NOTE — TELEPHONE ENCOUNTER
We can do a virtual, 40 min, virtual appt.  Last seen 18+ months ago.  I will order the Brain MRI for her.  However, the spine MRI needs to be ordered per her neuro-surgeon.

## 2020-09-28 ENCOUNTER — APPOINTMENT (OUTPATIENT)
Dept: DERMATOLOGY | Facility: IMAGING CENTER | Age: 75
End: 2020-09-28
Payer: MEDICARE

## 2020-10-15 ENCOUNTER — TELEPHONE (OUTPATIENT)
Dept: NEUROLOGY | Facility: MEDICAL CENTER | Age: 75
End: 2020-10-15

## 2020-10-15 NOTE — TELEPHONE ENCOUNTER
"Patient sent via [employer]     \"Hello Rasta,  Sorry for the misunderstanding...No, [facility], Dr. Crabtree just confirmed last month during her Zoom appt. that Gilma Lala has always ordered her MRI's, both Brain And Spine, the Cervical/Neck.   Yes, please have Gilma Lala order both,   Thank You...  Take Care,  Adalid Abad c/o Katt Abad\"     Patient is requesting to be sedated due to her claustrophobia , are you able to submit the MRI'S?   "

## 2020-10-15 NOTE — TELEPHONE ENCOUNTER
----- Message from Jessica Abad sent at 10/15/2020 11:33 AM PDT -----  Regarding: RE: Procedure Question  Contact: [phone]  Mell Hutson for the misunderstanding...No, [facility], Dr. Crabtree just confirmed last month during her Zoom appt. that Gilma Lala has always ordered her MRI's, both Brain And Spine, the Cervical/Neck.   Yes, please have Gilma Lala order both,   Thank You...  Take Care,  Adalid Abad c/o Katt Abad

## 2020-10-19 ENCOUNTER — TELEPHONE (OUTPATIENT)
Dept: NEUROLOGY | Facility: MEDICAL CENTER | Age: 75
End: 2020-10-19

## 2020-10-22 ENCOUNTER — TELEMEDICINE (OUTPATIENT)
Dept: NEUROLOGY | Facility: MEDICAL CENTER | Age: 75
End: 2020-10-22
Payer: MEDICARE

## 2020-10-22 VITALS — BODY MASS INDEX: 35.74 KG/M2 | WEIGHT: 183 LBS

## 2020-10-22 DIAGNOSIS — G44.219 EPISODIC TENSION-TYPE HEADACHE, NOT INTRACTABLE: ICD-10-CM

## 2020-10-22 DIAGNOSIS — Q04.8 DYSPLASTIC CEREBELLAR GANGLIOCYTOMA (HCC): ICD-10-CM

## 2020-10-22 DIAGNOSIS — I63.9 CEREBRAL INFARCTION, UNSPECIFIED MECHANISM (HCC): ICD-10-CM

## 2020-10-22 DIAGNOSIS — M54.2 CERVICALGIA: ICD-10-CM

## 2020-10-22 DIAGNOSIS — D36.10 DYSPLASTIC CEREBELLAR GANGLIOCYTOMA (HCC): ICD-10-CM

## 2020-10-22 PROCEDURE — 99213 OFFICE O/P EST LOW 20 MIN: CPT | Mod: 95,CR | Performed by: NURSE PRACTITIONER

## 2020-10-22 ASSESSMENT — FIBROSIS 4 INDEX: FIB4 SCORE: 1.12
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
## 2021-01-15 DIAGNOSIS — Z23 NEED FOR VACCINATION: ICD-10-CM

## 2021-04-26 DIAGNOSIS — I63.9 CEREBRAL INFARCTION, UNSPECIFIED MECHANISM (HCC): ICD-10-CM

## 2021-04-26 DIAGNOSIS — D36.10 DYSPLASTIC CEREBELLAR GANGLIOCYTOMA (HCC): ICD-10-CM

## 2021-04-26 DIAGNOSIS — Q04.8 DYSPLASTIC CEREBELLAR GANGLIOCYTOMA (HCC): ICD-10-CM

## 2021-05-26 ENCOUNTER — APPOINTMENT (OUTPATIENT)
Dept: DERMATOLOGY | Facility: IMAGING CENTER | Age: 76
End: 2021-05-26
Payer: MEDICARE

## 2021-06-17 ENCOUNTER — PATIENT MESSAGE (OUTPATIENT)
Dept: HEALTH INFORMATION MANAGEMENT | Facility: OTHER | Age: 76
End: 2021-06-17

## 2021-08-12 ENCOUNTER — OFFICE VISIT (OUTPATIENT)
Dept: DERMATOLOGY | Facility: IMAGING CENTER | Age: 76
End: 2021-08-12
Payer: MEDICARE

## 2021-08-12 VITALS — TEMPERATURE: 99 F | BODY MASS INDEX: 33.23 KG/M2 | WEIGHT: 176 LBS | HEIGHT: 61 IN

## 2021-08-12 DIAGNOSIS — L30.9 DERMATITIS: ICD-10-CM

## 2021-08-12 DIAGNOSIS — L72.3 SEBACEOUS CYST: ICD-10-CM

## 2021-08-12 DIAGNOSIS — I78.1 TELANGIECTASIS: ICD-10-CM

## 2021-08-12 PROCEDURE — 99213 OFFICE O/P EST LOW 20 MIN: CPT | Performed by: DERMATOLOGY

## 2021-08-12 NOTE — PROGRESS NOTES
CC: Cyst      Subjective:Previously seen patient here for cyst on forehead.    HPI/location: Forehead, cyst became large and painful, patient drained in last 1 week. Treating with neosporin after picking.  Time present: 2-3 years  Painful lesion: Yes  Itching lesion: No  Enlarging lesion: Yes  Anything make it better or worse? No      Bothered by red patches on face.   Desires trx for face blood vessels.     History of skin cancer: No  History of precancers/actinic keratoses: No  History of biopsies:Yes, Details: RT ear, benign  History of blistering/severe sunburns:Yes, Details: Lifetime exposure, lived in [place]  Family history of skin cancer:No  Family history of atypical moles:No    ROS: no fevers/chills. No itch.  No cough  Relevant PMH:mult med comorbidities  Social: NS    PE: Gen:WDWN female in NAD.  Skin: focal exam: crateriform appearing papule on forehead with central hemorrhagic crust and punctum appearing medial to it.  Faint pinking of forehead, cheeks. Scattered telangiectasias and dilated vessels on cheeks and chin.    A/P: neoplasm NOS, forehead:consider traumatized cyst, healing, by history supplied  -declined oral ABS  -mupirocin topical TIDX 7-10days  -gentle wash/gentle moisturizer use  -f/u 6-8 weeks  -bx if still present    Dermatitis, mild ETT rosacea possible:bothered by dryness:  -reviewed cerave/cetaphil to moisturize  -sunprotection    Telangiecatasias, face dilated blood vessels:   -cosmetics-Gisela ZHANG to discuss laser/trx options        I have reviewed medications relevant to my specialty.

## 2021-09-23 ENCOUNTER — PATIENT MESSAGE (OUTPATIENT)
Dept: HEALTH INFORMATION MANAGEMENT | Facility: OTHER | Age: 76
End: 2021-09-23

## 2021-11-08 ENCOUNTER — TELEPHONE (OUTPATIENT)
Dept: HEALTH INFORMATION MANAGEMENT | Facility: OTHER | Age: 76
End: 2021-11-08

## 2021-11-08 NOTE — TELEPHONE ENCOUNTER
Outcome: Left Message    Please transfer to Patient Outreach Team at [phone] when patient returns call.      [facility] Verified: yes    Attempt # 2      
within normal limits

## 2021-12-22 RX ORDER — LISINOPRIL 20 MG/1
20 TABLET ORAL 2 TIMES DAILY
Qty: 180 TABLET | Refills: 3 | Status: SHIPPED | OUTPATIENT
Start: 2021-12-22 | End: 2022-10-20

## 2022-02-01 RX ORDER — TIZANIDINE 4 MG/1
4 TABLET ORAL EVERY 6 HOURS PRN
Qty: 90 TABLET | Refills: 0 | Status: SHIPPED | OUTPATIENT
Start: 2022-02-01 | End: 2022-07-05

## 2022-02-04 ENCOUNTER — TELEMEDICINE (OUTPATIENT)
Dept: MEDICAL GROUP | Facility: CLINIC | Age: 77
End: 2022-02-04
Payer: MEDICARE

## 2022-02-04 DIAGNOSIS — Q04.8 DYSPLASTIC CEREBELLAR GANGLIOCYTOMA (HCC): ICD-10-CM

## 2022-02-04 DIAGNOSIS — D36.10 DYSPLASTIC CEREBELLAR GANGLIOCYTOMA (HCC): ICD-10-CM

## 2022-02-04 DIAGNOSIS — F41.9 ANXIETY: ICD-10-CM

## 2022-02-04 PROCEDURE — 99212 OFFICE O/P EST SF 10 MIN: CPT | Performed by: FAMILY MEDICINE

## 2022-02-04 RX ORDER — FLUTICASONE PROPIONATE 50 MCG
SPRAY, SUSPENSION (ML) NASAL
COMMUNITY
Start: 2019-08-27 | End: 2022-02-04

## 2022-02-04 RX ORDER — IBUPROFEN 800 MG/1
800 TABLET ORAL EVERY 8 HOURS PRN
Qty: 90 TABLET | Refills: 3 | Status: SHIPPED | OUTPATIENT
Start: 2022-02-04 | End: 2022-02-15

## 2022-02-04 RX ORDER — IPRATROPIUM BROMIDE 42 UG/1
SPRAY, METERED NASAL
COMMUNITY
Start: 2019-11-11 | End: 2022-02-04

## 2022-02-04 RX ORDER — TRAZODONE HYDROCHLORIDE 100 MG/1
100 TABLET ORAL NIGHTLY
COMMUNITY
End: 2022-11-22 | Stop reason: SDUPTHER

## 2022-02-04 RX ORDER — DULOXETIN HYDROCHLORIDE 30 MG/1
CAPSULE, DELAYED RELEASE ORAL
COMMUNITY
End: 2022-02-04

## 2022-02-04 NOTE — PROGRESS NOTES
Virtual Visit: Established Patient   This visit was conducted via Zoom using secure and encrypted videoconferencing technology.   The patient was in their home in the [facility].    The patient's identity was confirmed and verbal consent was obtained for this virtual visit.    Subjective:   CC:   Jessica Abad is a 76 y.o. female presenting for evaluation and management of: She needs a refill of her ibuprofen and she needs a new referral to neurology, her neurologist just retired.  She has a history of a CVA and a dysplastic cerebellar gangliocytoma        ROS   Overall she is feeling well and she has no complaints of fevers chills nausea vomiting cough shortness of breath or chest pain    Current medicines (including changes today)  Current Outpatient Medications   Medication Sig Dispense Refill   • traZODone (DESYREL) 100 MG Tab Take 100 mg by mouth every evening.     • ibuprofen (MOTRIN) 800 MG Tab Take 1 Tablet by mouth every 8 hours as needed for Mild Pain. WITH FOOD 90 Tablet 3   • tizanidine (ZANAFLEX) 4 MG Tab Take 1 Tablet by mouth every 6 hours as needed. 90 Tablet 0   • lisinopril (PRINIVIL) 20 MG Tab Take 1 Tablet by mouth 2 times a day. 180 Tablet 3   • mupirocin (BACTROBAN) 2 % Ointment Apply 1 Application topically 2 times a day. 22 g 0   • diphenhydrAMINE (BENADRYL) 50 MG Cap Take 1 Cap by mouth 3 times a day. 90 Cap 5   • gabapentin (NEURONTIN) 800 MG tablet Take 1 Tab by mouth 4 times a day. 400 Tab 0   • propranolol (INDERAL) 40 MG Tab Take 1 Tab by mouth 2 times a day. 200 Tab 0   • ROPINIRole (REQUIP) 1 MG Tab      • Coenzyme Q10 100 MG Cap Take 1 Cap by mouth every day.     • ascorbic acid (ASCORBIC ACID) 500 MG Tab Take 1,000 mg by mouth 2 Times a Day.     • clonazepam (KLONOPIN) 1 MG Tab Take 2 Tabs by mouth every bedtime. (Patient taking differently: Take 1 mg by mouth at bedtime.) 60 Tab 5   • Cholecalciferol (VITAMIN D) 2000 UNITS CAPS Take 1 Cap by mouth every day.       •  Probiotic Product (PROBIOTIC DAILY PO) Take 1 Cap by mouth every day.     • Non Formulary Request Take 1 Tab by mouth 3 times a day. **BETA TCD** for gallbladder     • Non Formulary Request Take 30 Drops by mouth 3 times a day. **SUPER PHOSPHATE LIQ**     • Non Formulary Request Take 1 Tab by mouth every bedtime. **MAGNESIUM AND POTASSIUM ASPARTATE**     • Non Formulary Request Take 1 Tab by mouth 3 times a day. **LIVOTRIT PLUS**       No current facility-administered medications for this visit.       Patient Active Problem List    Diagnosis Date Noted   • Anxiety 02/04/2022   • Obesity (BMI 35.0-39.9 without comorbidity) 04/03/2019   • Episodic tension-type headache, not intractable 01/29/2019   • 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 01/28/2019   • Rosacea 01/28/2019   • Actinic keratoses 01/28/2019   • Lumbosacral spondylosis without myelopathy 03/13/2015   • HTN (hypertension) 02/04/2015   • Back pain 08/07/2014   • Anemia 12/07/2011   • JIMMY (generalized anxiety disorder) 12/07/2011   • Fibromyalgia    • RLS (restless legs syndrome)    • Cerebral infarction (HCC)    • Dysplastic cerebellar gangliocytoma (HCC)         Objective:   There were no vitals taken for this visit.    Physical Exam:  Constitutional: Alert, no distress, well-groomed.  Skin: No rashes in visible areas.  Eye: Round. Conjunctiva clear, lids normal. No icterus.   ENMT: Lips pink without lesions, good dentition, moist mucous membranes. Phonation normal.  Neck: No masses, no thyromegaly. Moves freely without pain.  Respiratory: Unlabored respiratory effort, no cough or audible wheeze  Psych: Alert and oriented x3, normal affect and mood.     Assessment and Plan:   The following treatment plan was discussed:     1. Dysplastic cerebellar gangliocytoma (HCC)  - Referral to Neurology    2. Anxiety    Other orders  - traZODone (DESYREL) 100 MG Tab; Take 100 mg by mouth every evening.  - ibuprofen (MOTRIN) 800 MG Tab; Take 1 Tablet by mouth  every 8 hours as needed for Mild Pain. WITH FOOD  Dispense: 90 Tablet; Refill: 3      Follow-up: No follow-ups on file.  10 minutes spent on encounter

## 2022-02-15 RX ORDER — ROPINIROLE 1 MG/1
1 TABLET, FILM COATED ORAL 3 TIMES DAILY
Qty: 270 TABLET | Refills: 3 | Status: SHIPPED | OUTPATIENT
Start: 2022-02-15 | End: 2023-02-01

## 2022-02-24 NOTE — TELEPHONE ENCOUNTER
"Adalid emailed about Marge prescription. She needs of 6 tubes- 1% Gel/100gm Qty:600\"    Can you send in?  "

## 2022-05-23 ENCOUNTER — APPOINTMENT (OUTPATIENT)
Dept: NEUROLOGY | Facility: MEDICAL CENTER | Age: 77
End: 2022-05-23
Attending: PSYCHIATRY & NEUROLOGY
Payer: MEDICARE

## 2022-06-29 DIAGNOSIS — I10 PRIMARY HYPERTENSION: ICD-10-CM

## 2022-06-29 DIAGNOSIS — Z78.0 POSTMENOPAUSAL: ICD-10-CM

## 2022-06-29 DIAGNOSIS — Z12.31 SCREENING MAMMOGRAM FOR BREAST CANCER: ICD-10-CM

## 2022-07-05 RX ORDER — TIZANIDINE 4 MG/1
4 TABLET ORAL EVERY 6 HOURS PRN
Qty: 90 TABLET | Refills: 0 | Status: SHIPPED | OUTPATIENT
Start: 2022-07-05 | End: 2022-07-14 | Stop reason: SDUPTHER

## 2022-07-14 ENCOUNTER — OFFICE VISIT (OUTPATIENT)
Dept: MEDICAL GROUP | Facility: CLINIC | Age: 77
End: 2022-07-14
Payer: MEDICARE

## 2022-07-14 DIAGNOSIS — G89.29 CHRONIC PAIN: ICD-10-CM

## 2022-07-14 DIAGNOSIS — Z12.11 COLON CANCER SCREENING: ICD-10-CM

## 2022-07-14 DIAGNOSIS — I10 PRIMARY HYPERTENSION: ICD-10-CM

## 2022-07-14 DIAGNOSIS — L71.9 ROSACEA: ICD-10-CM

## 2022-07-14 DIAGNOSIS — Z12.31 SCREENING MAMMOGRAM FOR BREAST CANCER: ICD-10-CM

## 2022-07-14 DIAGNOSIS — F41.1 GENERALIZED ANXIETY DISORDER: Chronic | ICD-10-CM

## 2022-07-14 DIAGNOSIS — F41.9 ANXIETY: ICD-10-CM

## 2022-07-14 PROBLEM — G25.81 RESTLESS LEG SYNDROME: Status: ACTIVE | Noted: 2019-08-27

## 2022-07-14 PROBLEM — Z12.39 ENCOUNTER FOR OTHER SCREENING FOR MALIGNANT NEOPLASM OF BREAST: Status: ACTIVE | Noted: 2020-05-15

## 2022-07-14 PROCEDURE — 99442 PR PHYSICIAN TELEPHONE EVALUATION 11-20 MIN: CPT | Mod: 95 | Performed by: FAMILY MEDICINE

## 2022-07-14 RX ORDER — METRONIDAZOLE 7.5 MG/G
1 GEL TOPICAL 2 TIMES DAILY
Qty: 1 EACH | Refills: 3 | Status: SHIPPED | OUTPATIENT
Start: 2022-07-14 | End: 2023-06-19

## 2022-07-14 RX ORDER — CLONAZEPAM 1 MG/1
1 TABLET ORAL
Qty: 30 TABLET | Refills: 5 | Status: SHIPPED | OUTPATIENT
Start: 2022-07-14 | End: 2023-02-13

## 2022-07-14 RX ORDER — TIZANIDINE 4 MG/1
4 TABLET ORAL EVERY 6 HOURS PRN
Qty: 270 TABLET | Refills: 3 | Status: SHIPPED | OUTPATIENT
Start: 2022-07-14 | End: 2023-08-14 | Stop reason: SDUPTHER

## 2022-07-14 RX ORDER — IBUPROFEN 800 MG/1
800 TABLET ORAL EVERY 8 HOURS PRN
Qty: 50 TABLET | Refills: 3 | Status: SHIPPED | OUTPATIENT
Start: 2022-07-14 | End: 2022-10-07

## 2022-07-14 ASSESSMENT — PATIENT HEALTH QUESTIONNAIRE - PHQ9: CLINICAL INTERPRETATION OF PHQ2 SCORE: 0

## 2022-07-14 NOTE — PROGRESS NOTES
This telemedicine encounter was conducted via phone.  Verbal consent was obtained.  Patient was in a private location in the state of [place].  Patient's identity was verified.  Subjective:   CC:      HPI:     Problem   Screening Mammogram for Breast Cancer   Anxiety   Chronic Pain   Restless Leg Syndrome   Obesity With Body Mass Index 30 Or Greater   Rosacea   Hypertension       Current Outpatient Medications Ordered in Epic   Medication Sig Dispense Refill   • clonazePAM (KLONOPIN) 1 MG Tab Take 1 Tablet by mouth at bedtime for 30 days. 30 Tablet 5   • tizanidine (ZANAFLEX) 4 MG Tab Take 1 Tablet by mouth every 6 hours as needed (spasm). 270 Tablet 3   • ibuprofen (MOTRIN) 800 MG Tab Take 1 Tablet by mouth every 8 hours as needed (pain). 50 Tablet 3   • metronidazole (METROGEL) 0.75 % gel Apply 1 Application topically 2 times a day. 1 Each 3   • ROPINIRole (REQUIP) 1 MG Tab Take 1 Tablet by mouth 3 times a day. 270 Tablet 3   • traZODone (DESYREL) 100 MG Tab Take 100 mg by mouth every evening.     • lisinopril (PRINIVIL) 20 MG Tab Take 1 Tablet by mouth 2 times a day. 180 Tablet 3   • mupirocin (BACTROBAN) 2 % Ointment Apply 1 Application topically 2 times a day. 22 g 0   • diphenhydrAMINE (BENADRYL) 50 MG Cap Take 1 Cap by mouth 3 times a day. 90 Cap 5   • gabapentin (NEURONTIN) 800 MG tablet Take 1 Tab by mouth 4 times a day. 400 Tab 0   • propranolol (INDERAL) 40 MG Tab Take 1 Tab by mouth 2 times a day. 200 Tab 0   • Coenzyme Q10 100 MG Cap Take 1 Cap by mouth every day.     • ascorbic acid (ASCORBIC ACID) 500 MG Tab Take 1,000 mg by mouth 2 Times a Day.     • Probiotic Product (PROBIOTIC DAILY PO) Take 1 Cap by mouth every day.     • Non Formulary Request Take 1 Tab by mouth 3 times a day. **BETA TCD** for gallbladder     • Non Formulary Request Take 30 Drops by mouth 3 times a day. **SUPER PHOSPHATE LIQ**     • Non Formulary Request Take 1 Tab by mouth every bedtime. **MAGNESIUM AND POTASSIUM ASPARTATE**      • Non Formulary Request Take 1 Tab by mouth 3 times a day. **LIVOTRIT PLUS**     • Cholecalciferol (VITAMIN D) 2000 UNITS CAPS Take 1 Cap by mouth every day.         No current Epic-ordered facility-administered medications on file.         ROS:  Gen: no fevers/chills  Pulm: no sob, no cough  CV: no chest pain, no palpitations  GI: no nausea/vomiting, no diarrhea        Objective:     Exam:  There were no vitals taken for this visit. There is no height or weight on file to calculate BMI.          Assessment & Plan:     76 y.o. female with the following -     Problem List Items Addressed This Visit     JIMMY (generalized anxiety disorder) (Chronic)    Chronic pain    Relevant Medications    clonazePAM (KLONOPIN) 1 MG Tab    tizanidine (ZANAFLEX) 4 MG Tab    ibuprofen (MOTRIN) 800 MG Tab    Hypertension    Rosacea     She is due for renewal of her metronidazole, I will go ahead and submit this.           Anxiety     She has been doing great on her dose of Klonopin 1 mg once a day, she has weaned down from multiple doses a day.  I will refill this for her today, no concerns from her controlled substance review.           Screening mammogram for breast cancer     She has dense breast so we will order the mammogram and ultrasound           Relevant Orders    MA-SCREENING MAMMO BILAT W/CAD    US-SCREENING WHOLE BREAST (3D SCREENING)              15 minutes - phone encounter      No follow-ups on file.

## 2022-07-14 NOTE — ASSESSMENT & PLAN NOTE
She has been doing great on her dose of Klonopin 1 mg once a day, she has weaned down from multiple doses a day.  I will refill this for her today, no concerns from her controlled substance review.

## 2022-07-22 ENCOUNTER — TELEPHONE (OUTPATIENT)
Dept: MEDICAL GROUP | Facility: CLINIC | Age: 77
End: 2022-07-22
Payer: MEDICARE

## 2022-07-22 RX ORDER — FLUTICASONE PROPIONATE 50 MCG
1 SPRAY, SUSPENSION (ML) NASAL DAILY
Qty: 16 G | Refills: 11 | Status: SHIPPED | OUTPATIENT
Start: 2022-07-22 | End: 2023-08-08

## 2022-07-22 NOTE — TELEPHONE ENCOUNTER
"From Email   \"Mundo Saha,  How are You?  Last week during Katt's phone appt. with Dr. Reyes, Katt asked the Doctor if She could call in with the rest of the renewals a replacement for Her Benadryl.     Doctor agreed not the best for Her anymorfe and would call in another medication to replace for Katt's Sinuses attacks.  It doesn't come in with the other renewals, can You check on it and maybe  under Her understanably Covid illness, might of just forgotten.    BTW, How is the Doctor feeling now?    Thanks again for all Your help and if You can look into this and call/fax it into [facility] in [place], much appreciated!    Have a Great weekend,    Adalid Abad/co Katt Abad, 1945    "

## 2022-07-22 NOTE — TELEPHONE ENCOUNTER
Patient is requesting a replacement for Benadryl and this was discussed during her telephone visit. Thank you

## 2022-08-19 ENCOUNTER — TELEPHONE (OUTPATIENT)
Dept: HEALTH INFORMATION MANAGEMENT | Facility: OTHER | Age: 77
End: 2022-08-19
Payer: MEDICARE

## 2022-08-23 ENCOUNTER — PATIENT MESSAGE (OUTPATIENT)
Dept: MEDICAL GROUP | Facility: CLINIC | Age: 77
End: 2022-08-23
Payer: MEDICARE

## 2022-09-22 DIAGNOSIS — M79.2 POLYNEUROPATHIC PAIN: ICD-10-CM

## 2022-09-22 RX ORDER — GABAPENTIN 800 MG/1
800 TABLET ORAL 4 TIMES DAILY
Qty: 360 TABLET | Refills: 3 | Status: SHIPPED | OUTPATIENT
Start: 2022-09-22 | End: 2023-08-14 | Stop reason: SDUPTHER

## 2022-10-03 DIAGNOSIS — R25.1 TREMORS OF NERVOUS SYSTEM: ICD-10-CM

## 2022-10-03 RX ORDER — PROPRANOLOL HYDROCHLORIDE 40 MG/1
40 TABLET ORAL 2 TIMES DAILY
Qty: 200 TABLET | Refills: 3 | Status: SHIPPED | OUTPATIENT
Start: 2022-10-03 | End: 2023-08-14 | Stop reason: SDUPTHER

## 2022-10-05 ENCOUNTER — TELEPHONE (OUTPATIENT)
Dept: MEDICAL GROUP | Facility: CLINIC | Age: 77
End: 2022-10-05
Payer: MEDICARE

## 2022-10-05 ENCOUNTER — PATIENT MESSAGE (OUTPATIENT)
Dept: MEDICAL GROUP | Facility: CLINIC | Age: 77
End: 2022-10-05
Payer: MEDICARE

## 2022-10-05 DIAGNOSIS — Z13.79 GENETIC SCREENING: ICD-10-CM

## 2022-10-05 DIAGNOSIS — Z84.89 FAMILY HISTORY OF NEOPLASM OF BREAST: ICD-10-CM

## 2022-10-05 DIAGNOSIS — E66.9 OBESITY WITH BODY MASS INDEX 30 OR GREATER: ICD-10-CM

## 2022-10-05 DIAGNOSIS — I10 PRIMARY HYPERTENSION: ICD-10-CM

## 2022-10-07 RX ORDER — IBUPROFEN 800 MG/1
TABLET ORAL
Qty: 50 TABLET | Refills: 3 | Status: SHIPPED | OUTPATIENT
Start: 2022-10-07 | End: 2022-12-27

## 2022-10-20 RX ORDER — LISINOPRIL 20 MG/1
TABLET ORAL
Qty: 180 TABLET | Refills: 3 | Status: SHIPPED | OUTPATIENT
Start: 2022-10-20 | End: 2023-08-14 | Stop reason: SDUPTHER

## 2022-11-15 DIAGNOSIS — Z12.31 SCREENING MAMMOGRAM FOR BREAST CANCER: ICD-10-CM

## 2022-11-22 RX ORDER — TRAZODONE HYDROCHLORIDE 100 MG/1
100 TABLET ORAL NIGHTLY
Qty: 90 TABLET | Refills: 3 | Status: SHIPPED | OUTPATIENT
Start: 2022-11-22 | End: 2023-08-14 | Stop reason: SDUPTHER

## 2022-12-13 ENCOUNTER — TELEPHONE (OUTPATIENT)
Dept: MEDICAL GROUP | Facility: CLINIC | Age: 77
End: 2022-12-13
Payer: MEDICARE

## 2022-12-13 ENCOUNTER — APPOINTMENT (OUTPATIENT)
Dept: RADIOLOGY | Facility: MEDICAL CENTER | Age: 77
End: 2022-12-13
Attending: FAMILY MEDICINE
Payer: MEDICARE

## 2022-12-13 NOTE — TELEPHONE ENCOUNTER
"\"Mundo Saha,    I sent over  an email informing the Doctor and yourself that I was only using  this message form because I couldn't send or receive any messages on [employer].  Doctor Bro, and understandably,  sent me an email asking that I use Katt's Chart, and of course, its the preferable way, but hopefully You understand I can't wait for [employer] to fix their website glitch.    In the message, I was just asking if Your office has Katt's vaccine records and other vaccines besides Covid-19 boosters? Her patient portal on [employer] does not    Do You have Her Pneumonia vaccine records and dates?   Would like the info on [id number], 20, and 23.  Thank You.    I am taking care of her bout with the Flu that is going through [facility] right now, but outside of all the symptoms, she is having a rough time with the coughing... She is coughing up the right pathogens in color, but the coughing is severe, not severe enough we believe that it has advanced into Bronchitis.    Is there any prescription for this Flu cough that You can call into Her Pharmacy or should I just go online or just ride it out..  Of course, I am infected now too, but both our temps are down, fighting through this, my cough is not as severe as Katt's, hopefully because of my stronger immune system.    Ironically, Friday afternoon we were both scheduled to get our flu shots    Do You advise to receive anyway them \"after\" we recover from this flu?    Please try to reply to this message...Again, ollie, [employer]'s message system to \" Ask The Doctor A Question\" was down and still is.    Take Care & Happy Holidays.    Adalid & Katt Abad\"      "

## 2022-12-14 RX ORDER — BENZONATATE 100 MG/1
100 CAPSULE ORAL 3 TIMES DAILY PRN
Qty: 60 CAPSULE | Refills: 0 | Status: SHIPPED | OUTPATIENT
Start: 2022-12-14 | End: 2023-08-08

## 2022-12-27 RX ORDER — IBUPROFEN 800 MG/1
TABLET ORAL
Qty: 50 TABLET | Refills: 3 | Status: SHIPPED | OUTPATIENT
Start: 2022-12-27 | End: 2023-07-10

## 2022-12-27 NOTE — TELEPHONE ENCOUNTER
Received request via: Pharmacy    Was the patient seen in the last year in this department? Yes    Does the patient have an active prescription (recently filled or refills available) for medication(s) requested? No    Does the patient have longterm Plus and need 100 day supply (blood pressure, diabetes and cholesterol meds only)? Medication is not for cholesterol, blood pressure or diabetes

## 2023-02-01 RX ORDER — ROPINIROLE 1 MG/1
TABLET, FILM COATED ORAL
Qty: 270 TABLET | Refills: 3 | Status: SHIPPED | OUTPATIENT
Start: 2023-02-01 | End: 2023-08-14

## 2023-02-01 RX ORDER — ROPINIROLE 1 MG/1
1 TABLET, FILM COATED ORAL 3 TIMES DAILY
Qty: 270 TABLET | Refills: 3 | Status: SHIPPED | OUTPATIENT
Start: 2023-02-01 | End: 2023-08-14

## 2023-04-13 DIAGNOSIS — I63.9 CEREBRAL INFARCTION, UNSPECIFIED MECHANISM (HCC): ICD-10-CM

## 2023-04-13 DIAGNOSIS — R25.1 TREMORS OF NERVOUS SYSTEM: ICD-10-CM

## 2023-04-13 DIAGNOSIS — Q04.8 DYSPLASTIC CEREBELLAR GANGLIOCYTOMA (HCC): Chronic | ICD-10-CM

## 2023-04-13 DIAGNOSIS — D36.10 DYSPLASTIC CEREBELLAR GANGLIOCYTOMA (HCC): Chronic | ICD-10-CM

## 2023-05-17 ENCOUNTER — APPOINTMENT (OUTPATIENT)
Dept: RADIOLOGY | Facility: MEDICAL CENTER | Age: 78
End: 2023-05-17
Attending: FAMILY MEDICINE
Payer: MEDICARE

## 2023-06-07 DIAGNOSIS — Q04.8 DYSPLASTIC CEREBELLAR GANGLIOCYTOMA (HCC): Chronic | ICD-10-CM

## 2023-06-07 DIAGNOSIS — D36.10 DYSPLASTIC CEREBELLAR GANGLIOCYTOMA (HCC): Chronic | ICD-10-CM

## 2023-06-08 ENCOUNTER — PATIENT MESSAGE (OUTPATIENT)
Dept: HOSPITALIST | Facility: MEDICAL CENTER | Age: 78
End: 2023-06-08
Payer: MEDICARE

## 2023-06-08 DIAGNOSIS — D36.10 DYSPLASTIC CEREBELLAR GANGLIOCYTOMA (HCC): Chronic | ICD-10-CM

## 2023-06-08 DIAGNOSIS — Q04.8 DYSPLASTIC CEREBELLAR GANGLIOCYTOMA (HCC): Chronic | ICD-10-CM

## 2023-06-08 DIAGNOSIS — F41.1 GENERALIZED ANXIETY DISORDER: Chronic | ICD-10-CM

## 2023-06-08 RX ORDER — CLONAZEPAM 1 MG/1
1 TABLET ORAL
Qty: 30 TABLET | Refills: 5 | Status: SHIPPED | OUTPATIENT
Start: 2023-06-08 | End: 2023-08-14 | Stop reason: SDUPTHER

## 2023-06-08 RX ORDER — CLONAZEPAM 1 MG/1
1 TABLET ORAL
COMMUNITY
Start: 2023-05-12 | End: 2023-06-08 | Stop reason: SDUPTHER

## 2023-06-08 RX ORDER — CLONAZEPAM 1 MG/1
TABLET ORAL
Qty: 30 TABLET | Refills: 5 | Status: SHIPPED | OUTPATIENT
Start: 2023-06-08 | End: 2023-07-08

## 2023-06-14 ENCOUNTER — TELEPHONE (OUTPATIENT)
Dept: NEUROLOGY | Facility: MEDICAL CENTER | Age: 78
End: 2023-06-14
Payer: MEDICARE

## 2023-06-14 ENCOUNTER — TELEPHONE (OUTPATIENT)
Dept: SCHEDULING | Facility: IMAGING CENTER | Age: 78
End: 2023-06-14

## 2023-06-14 DIAGNOSIS — D36.10 DYSPLASTIC CEREBELLAR GANGLIOCYTOMA (HCC): Chronic | ICD-10-CM

## 2023-06-14 DIAGNOSIS — Q04.8 DYSPLASTIC CEREBELLAR GANGLIOCYTOMA (HCC): Chronic | ICD-10-CM

## 2023-06-14 NOTE — TELEPHONE ENCOUNTER
Patient caregiver called asking for an MRI order before upcoming appointment in August. Please advise. Thank you. REJI Acosta.

## 2023-06-14 NOTE — TELEPHONE ENCOUNTER
Patient needs to get an MRI but needs to have anesthesia prior to the MRI.   states imaging says she needs a referral for that so it can be administered prior to the MRI.  Please advise.      [phone]    Thank You,   Rosette SIMMS

## 2023-06-19 RX ORDER — METRONIDAZOLE 7.5 MG/G
GEL TOPICAL
Qty: 45 G | Refills: 1 | Status: SHIPPED | OUTPATIENT
Start: 2023-06-19 | End: 2023-08-14 | Stop reason: SDUPTHER

## 2023-06-20 ENCOUNTER — APPOINTMENT (OUTPATIENT)
Dept: MEDICAL GROUP | Facility: CLINIC | Age: 78
End: 2023-06-20
Payer: MEDICARE

## 2023-06-29 ENCOUNTER — APPOINTMENT (OUTPATIENT)
Dept: RADIOLOGY | Facility: MEDICAL CENTER | Age: 78
End: 2023-06-29
Attending: FAMILY MEDICINE
Payer: MEDICARE

## 2023-07-06 NOTE — TELEPHONE ENCOUNTER
mcReceived request via: Pharmacy    Was the patient seen in the last year in this department? Yes    Does the patient have an active prescription (recently filled or refills available) for medication(s) requested? No    Does the patient have assisted Plus and need 100 day supply (blood pressure, diabetes and cholesterol meds only)? Patient does not have SCP

## 2023-07-10 RX ORDER — IBUPROFEN 800 MG/1
TABLET ORAL
Qty: 50 TABLET | Refills: 3 | Status: SHIPPED | OUTPATIENT
Start: 2023-07-10 | End: 2023-08-14 | Stop reason: SDUPTHER

## 2023-08-07 ENCOUNTER — APPOINTMENT (OUTPATIENT)
Dept: MEDICAL GROUP | Facility: CLINIC | Age: 78
End: 2023-08-07
Payer: MEDICARE

## 2023-08-08 ENCOUNTER — OFFICE VISIT (OUTPATIENT)
Dept: NEUROLOGY | Facility: MEDICAL CENTER | Age: 78
End: 2023-08-08
Attending: PSYCHIATRY & NEUROLOGY
Payer: MEDICARE

## 2023-08-08 VITALS
HEIGHT: 61 IN | BODY MASS INDEX: 36.13 KG/M2 | WEIGHT: 191.36 LBS | OXYGEN SATURATION: 92 % | HEART RATE: 68 BPM | TEMPERATURE: 98.2 F | DIASTOLIC BLOOD PRESSURE: 84 MMHG | SYSTOLIC BLOOD PRESSURE: 126 MMHG

## 2023-08-08 DIAGNOSIS — Q04.8 DYSPLASTIC CEREBELLAR GANGLIOCYTOMA (HCC): ICD-10-CM

## 2023-08-08 DIAGNOSIS — D36.10 DYSPLASTIC CEREBELLAR GANGLIOCYTOMA (HCC): ICD-10-CM

## 2023-08-08 PROCEDURE — 3079F DIAST BP 80-89 MM HG: CPT | Performed by: PSYCHIATRY & NEUROLOGY

## 2023-08-08 PROCEDURE — 99202 OFFICE O/P NEW SF 15 MIN: CPT | Performed by: PSYCHIATRY & NEUROLOGY

## 2023-08-08 PROCEDURE — 3074F SYST BP LT 130 MM HG: CPT | Performed by: PSYCHIATRY & NEUROLOGY

## 2023-08-08 PROCEDURE — 99204 OFFICE O/P NEW MOD 45 MIN: CPT | Performed by: PSYCHIATRY & NEUROLOGY

## 2023-08-08 ASSESSMENT — PATIENT HEALTH QUESTIONNAIRE - PHQ9: CLINICAL INTERPRETATION OF PHQ2 SCORE: 0

## 2023-08-08 NOTE — PROGRESS NOTES
"Chief Complaint   Patient presents with    Establish Care     NP: Needs a whole body MRI w/ anesthesia, claustrophobic , equal librium feels off, legs are cramping- lasted ab 40 min, thinks she may be be getting neuropathy, stated she has lesions on brain       History of present illness:  LISA AIKEN 77 y.o. 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  She states that she is unable to have MRI without general anesthesia.   She has whole body pain and she has intermittent cramping of her extremities.   She has been aware of her cerebellar tumor since 1994. This was discovered in [place] due to neck problems.   She feels that her balance and equilibrium is impaired.   The last MRI brain was done in 2018.   She has bilateral tinnitus like a \"high pitched ringing\" that she developed after being treated for RLS with quinine.     Past medical history:   Past Medical History:   Diagnosis Date    Actinic keratitis 1/28/2019    Actinic keratoses 1/28/2019    Anxiety     Breath shortness     slightly short of breath with exertion    CVA (cerebral infarction) 2004    STATES THIS HAS AFFECTED HER WRITING.   USED TO BE AN ARTIST. ALSO HAD SHORT TERM MEMORY LOSS.    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 at [facility]    NEURO DEST " FACET L/S W/IG SNGL  8/7/2014    Performed by Raf Sutherland D.O. at [facility]    NEURO DEST FACET L/S W/IG ADDL  8/7/2014    Performed by Raf Sutherland D.O. at [facility]    NEURO DEST FACET L/S W/IG ADDL  8/7/2014    Performed by Raf uStherland D.O. at [facility]    NEURO DEST FACET L/S W/IG ADDL  8/7/2014    Performed by Raf Sutherland D.O. at [facility]    HI INJ DX/THER AGNT PARAVERT FACET JOINT, KATRIN*  11/14/2011    Performed by LIZZETH MERRILL at [facility]    HI INJ DX/THER AGNT PARAVERT FACET JOINT, CE*  11/14/2011    Performed by LIZZETH MERRILL at [facility]    ABDOMINAL HYSTERECTOMY TOTAL      KNEE REPLACEMENT, TOTAL Right     OTHER ORTHOPEDIC SURGERY      NECK, BACK       Family history:   Family History   Problem Relation Age of Onset    Heart Disease Father         MI AT 42 Y/0    Hypertension Father     Stroke Mother     Breast Cancer Mother     Diabetes Maternal Grandfather        Social history:   Social History     Socioeconomic History    Marital status:      Spouse name: Not on file    Number of children: Not on file    Years of education: Not on file    Highest education level: Not on file   Occupational History    Not on file   Tobacco Use    Smoking status: Never    Smokeless tobacco: Never   Vaping Use    Vaping Use: Never used   Substance and Sexual Activity    Alcohol use: No    Drug use: No    Sexual activity: Not on file   Other Topics Concern    Not on file   Social History Narrative    Not on file     Social Determinants of Health     Financial Resource Strain: Not on file   Food Insecurity: Not on file   Transportation Needs: Not on file   Physical Activity: Not on file   Stress: Not on file   Social Connections: Not on file   Intimate Partner Violence: Not on file   Housing Stability: Not on file       Current medications:   Current Outpatient Medications   Medication     "ibuprofen (MOTRIN) 800 MG Tab    metronidazole (METROGEL) 0.75 % gel    clonazePAM (KLONOPIN) 1 MG Tab    diclofenac sodium (VOLTAREN) 1 % Gel    ROPINIRole (REQUIP) 1 MG Tab    ROPINIRole (REQUIP) 1 MG Tab    traZODone (DESYREL) 100 MG Tab    lisinopril (PRINIVIL) 20 MG Tab    propranolol (INDERAL) 40 MG Tab    gabapentin (NEURONTIN) 800 MG tablet    tizanidine (ZANAFLEX) 4 MG Tab    Coenzyme Q10 100 MG Cap    ascorbic acid (ASCORBIC ACID) 500 MG Tab    Probiotic Product (PROBIOTIC DAILY PO)    Non Formulary Request    Non Formulary Request    Non Formulary Request    Cholecalciferol (VITAMIN D) 2000 UNITS CAPS    benzonatate (TESSALON) 100 MG Cap    fluticasone (FLONASE) 50 MCG/ACT nasal spray    mupirocin (BACTROBAN) 2 % Ointment    diphenhydrAMINE (BENADRYL) 50 MG Cap    Non Formulary Request     No current facility-administered medications for this visit.       Medication Allergy:  Allergies   Allergen Reactions    Nsaids Hives, Rash and Itching    Asa [Aspirin]      BAD ACID REFLUX    Codeine     Morphine     Sulfa Drugs        Physical examination:   Vitals:    08/08/23 1450   BP: 126/84   BP Location: Right arm   Patient Position: Sitting   BP Cuff Size: Adult   Pulse: 68   Temp: 36.8 °C (98.2 °F)   TempSrc: Temporal   SpO2: 92%   Weight: 86.8 kg (191 lb 5.8 oz)   Height: 1.549 m (5' 1\")     Neurological Exam  Mental Status  Awake and alert. Speech is normal. Language is fluent with no aphasia.    Cranial Nerves  CN III, IV, VI: Extraocular movements intact bilaterally. No nystagmus. Normal smooth pursuit.    Reflexes                                            Right                      Left  Brachioradialis                    2+                         2+  Biceps                                 2+                         2+  Patellar                                2+                         2+    Coordination  Right: Finger-to-nose normal. Rapid alternating movement normal. Heel-to-shin normal.Left: " Finger-to-nose normal. Rapid alternating movement normal. Heel-to-shin normal.      Labs:  I reviewed the following labs personally:  None     Imagin MR BRAIN W/ AND W/O  I reviewed the images personally and agree with the following read:     FINDINGS:  Again noted is a lobular nodular mass with heterogeneous T2 hyperintensity involving the cerebellar vermis. Additionally there are several varying sized subcentimeter fluid signal intensity lesions associated with this mass the previously noted nodular   areas of enhancement in this mass have resolved.     There is a small ovoid region of decreased T1 and increased T2 signal intensity in the right caudate nucleus     The calvariae are unremarkable. There are no extra-axial fluid collections. The ventricular system and basal cisterns are within normal limits. There are no mass effects or shift of midline structures. There are no hemorrhagic lesions. The   diffusion-weighted axial images show no evidence of acute cerebral infarction.     The postcontrast images show no areas of abnormal parenchymal or meningeal enhancement.     The brainstem and posterior fossa structures are unremarkable.     Vascular flow voids in the vertebrobasilar and carotid arteries, [place], and dural venous sinuses are intact.     The paranasal sinuses and mastoids in the field of view are unremarkable.     IMPRESSION:        1.  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 Previously noted nodular areas of enhancement in this lesion have resolved.     2.  Stable small area of chronic lacunar infarction in the right caudate nucleus.      ASSESSMENT AND PLAN:  Problem List Items Addressed This Visit       Dysplastic cerebellar gangliocytoma (HCC) (Chronic)    Relevant Orders    MR-BRAIN-WITH & W/O       1. Dysplastic cerebellar gangliocytoma (HCC)  - MR-BRAIN-WITH & W/O;  Future    I have ordered repeat brain MRI for monitoring of this patient's cerebellar vermian tumor. She has chronic neuropathic pain in her extremities and an unsteady gait but there is no dysmetria/ataxia. I have ordered the MRI scan with general anesthesia per patient's request.     FOLLOW-UP:   Return if symptoms worsen or fail to improve.    Total time spent for the day for this patient unrelated to procedure time is: 37 minutes. I spent 32 minutes in face to face time and I spent 2 minutes pre-charting and 3 minutes in post-visit documentation.      AHMET GaoO.  [facility]

## 2023-08-14 ENCOUNTER — OFFICE VISIT (OUTPATIENT)
Dept: MEDICAL GROUP | Facility: CLINIC | Age: 78
End: 2023-08-14
Payer: MEDICARE

## 2023-08-14 DIAGNOSIS — D36.10 DYSPLASTIC CEREBELLAR GANGLIOCYTOMA (HCC): Chronic | ICD-10-CM

## 2023-08-14 DIAGNOSIS — G89.4 CHRONIC PAIN SYNDROME: ICD-10-CM

## 2023-08-14 DIAGNOSIS — I10 PRIMARY HYPERTENSION: ICD-10-CM

## 2023-08-14 DIAGNOSIS — M79.2 POLYNEUROPATHIC PAIN: ICD-10-CM

## 2023-08-14 DIAGNOSIS — Q04.8 DYSPLASTIC CEREBELLAR GANGLIOCYTOMA (HCC): Chronic | ICD-10-CM

## 2023-08-14 DIAGNOSIS — Z12.11 COLON CANCER SCREENING: ICD-10-CM

## 2023-08-14 DIAGNOSIS — F32.A DEPRESSION: ICD-10-CM

## 2023-08-14 DIAGNOSIS — R25.1 TREMORS OF NERVOUS SYSTEM: ICD-10-CM

## 2023-08-14 DIAGNOSIS — F41.1 GENERALIZED ANXIETY DISORDER: Chronic | ICD-10-CM

## 2023-08-14 DIAGNOSIS — Z13.820 OSTEOPOROSIS SCREENING: ICD-10-CM

## 2023-08-14 DIAGNOSIS — G62.9 NEUROPATHY: ICD-10-CM

## 2023-08-14 DIAGNOSIS — G25.81 RESTLESS LEG SYNDROME: ICD-10-CM

## 2023-08-14 PROCEDURE — 99214 OFFICE O/P EST MOD 30 MIN: CPT | Mod: GC

## 2023-08-14 RX ORDER — TIZANIDINE 4 MG/1
4 TABLET ORAL EVERY 6 HOURS PRN
Qty: 270 TABLET | Refills: 3 | Status: SHIPPED | OUTPATIENT
Start: 2023-08-14 | End: 2024-03-10

## 2023-08-14 RX ORDER — CLONAZEPAM 1 MG/1
1 TABLET ORAL
Qty: 90 TABLET | Refills: 1 | Status: SHIPPED | OUTPATIENT
Start: 2023-08-14 | End: 2024-02-10

## 2023-08-14 RX ORDER — MULTIVIT-MIN/IRON/FOLIC ACID/K 18-600-40
1 CAPSULE ORAL DAILY
Qty: 30 CAPSULE | Refills: 11 | Status: SHIPPED | OUTPATIENT
Start: 2023-08-14 | End: 2023-08-17

## 2023-08-14 RX ORDER — TRAZODONE HYDROCHLORIDE 100 MG/1
100 TABLET ORAL NIGHTLY
Qty: 90 TABLET | Refills: 3 | Status: SHIPPED | OUTPATIENT
Start: 2023-08-14 | End: 2023-11-29 | Stop reason: SDUPTHER

## 2023-08-14 RX ORDER — LISINOPRIL 20 MG/1
20 TABLET ORAL 2 TIMES DAILY
Qty: 180 TABLET | Refills: 3 | Status: SHIPPED | OUTPATIENT
Start: 2023-08-14 | End: 2023-10-24

## 2023-08-14 RX ORDER — ASCORBIC ACID 500 MG
1000 TABLET ORAL 2 TIMES DAILY
Qty: 30 TABLET | Refills: 11 | Status: SHIPPED | OUTPATIENT
Start: 2023-08-14 | End: 2024-01-18

## 2023-08-14 RX ORDER — IBUPROFEN 800 MG/1
800 TABLET ORAL EVERY 8 HOURS PRN
Qty: 50 TABLET | Refills: 3 | Status: SHIPPED | OUTPATIENT
Start: 2023-08-14 | End: 2024-03-10

## 2023-08-14 RX ORDER — ROPINIROLE 1 MG/1
1 TABLET, FILM COATED ORAL 4 TIMES DAILY
Qty: 120 TABLET | Refills: 11 | Status: SHIPPED | OUTPATIENT
Start: 2023-08-14 | End: 2024-08-08

## 2023-08-14 RX ORDER — METRONIDAZOLE 7.5 MG/G
1 GEL TOPICAL 2 TIMES DAILY
Qty: 45 G | Refills: 1 | Status: SHIPPED | OUTPATIENT
Start: 2023-08-14 | End: 2024-03-10

## 2023-08-14 RX ORDER — PROPRANOLOL HYDROCHLORIDE 40 MG/1
40 TABLET ORAL 2 TIMES DAILY
Qty: 200 TABLET | Refills: 3 | Status: SHIPPED | OUTPATIENT
Start: 2023-08-14 | End: 2023-11-29 | Stop reason: SDUPTHER

## 2023-08-14 RX ORDER — GABAPENTIN 800 MG/1
1200 TABLET ORAL 3 TIMES DAILY
Qty: 405 TABLET | Refills: 3 | Status: SHIPPED | OUTPATIENT
Start: 2023-08-14 | End: 2024-01-18

## 2023-08-14 NOTE — PROGRESS NOTES
Subjective:     CC: Follow-up    HPI:   LISA with pmhx dysplastic cerebellar gangliocytoma,  JIMMY, restless leg syndrome, hypertension, and rosacea who presents today with neuropathy. Patient reports that she has had neuropathy for over a year. She describes numbness, heel pain, and a pin and needle sensation in her feet. Patient's Gabapentin was increased to 1,200 mg three times daily. She states that she needs a new prescription due to the increase in dosage. Patient has been feeling more tired throughout the day on this increased dose.     Patient is also requesting refills on her other medications. She is currently on clonazepam which she was previously taking at higher dose and has been weaned down to 1 mg.  Patient reports that when trying to be weaned off of the 1 mg she was experiencing shakiness and overall felt unwell.  She would like to stay on the current dose given the side effects she experienced.  She did not is also requesting refills of her lisinopril, metronidazole, propanolol, tizanidine, and trazodone.  Patient describes faculty with insomnia and has required several medications to help her fall asleep at night.  She states that she still wakes up early in the morning at around 4 or 5 AM.    Patient reports worsening in her restless leg and states that now when she is in a car for long periods of time, her legs will feel restless.  She is requesting an increase in her current ropinirole.      Problem   Neuropathy   Chronic Pain   Restless Leg Syndrome   Hypertension   JIMMY (generalized anxiety disorder)    IMO load March 2020     Dysplastic cerebellar gangliocytoma (HCC)       Current Outpatient Medications Ordered in Epic   Medication Sig Dispense Refill    gabapentin (NEURONTIN) 800 MG tablet Take 1.5 Tablets by mouth 3 times a day for 360 days. 405 Tablet 3    ROPINIRole (REQUIP) 1 MG Tab Take 1 Tablet by mouth 4 times a day for 360 days. 120 Tablet 11    clonazePAM (KLONOPIN) 1 MG Tab Take  "1 Tablet by mouth at bedtime for 180 days. 90 Tablet 1    ibuprofen (MOTRIN) 800 MG Tab Take 1 Tablet by mouth every 8 hours as needed for Mild Pain for up to 67 days. for pain 50 Tablet 3    diclofenac sodium (VOLTAREN) 1 % Gel Apply 2 g topically 4 times a day as needed (for joint pain). 360 g 11    lisinopril (PRINIVIL) 20 MG Tab Take 1 Tablet by mouth 2 times a day. 180 Tablet 3    metronidazole (METROGEL) 0.75 % gel Apply 1 Application topically 2 times a day. APPLY TOPICALLY TO THE AFFECTED AREA TWICE DAILY 45 g 1    propranolol (INDERAL) 40 MG Tab Take 1 Tablet by mouth 2 times a day. 200 Tablet 3    tizanidine (ZANAFLEX) 4 MG Tab Take 1 Tablet by mouth every 6 hours as needed (spasm). 270 Tablet 3    traZODone (DESYREL) 100 MG Tab Take 1 Tablet by mouth every evening. 90 Tablet 3    ascorbic acid (ASCORBIC ACID) 500 MG Tab Take 2 Tablets by mouth 2 times a day. 30 Tablet 11    Cholecalciferol (VITAMIN D) 50 MCG (2000 UT) Cap Take 1 Capsule by mouth every day.   30 Capsule 11    Coenzyme Q10 100 MG Cap Take 1 Cap by mouth every day.      Probiotic Product (PROBIOTIC DAILY PO) Take 1 Cap by mouth every day.      Non Formulary Request Take 1 Tab by mouth 3 times a day. **BETA TCD** for gallbladder      Non Formulary Request Take 30 Drops by mouth 3 times a day. **SUPER PHOSPHATE LIQ**      Non Formulary Request Take 1 Tab by mouth every bedtime. **MAGNESIUM AND POTASSIUM ASPARTATE**       No current Epic-ordered facility-administered medications on file.       ROS:  ROS as per HPI. Otherwise negative.      Objective:     Exam:  BP (P) 122/72 (BP Location: Left arm, Patient Position: Sitting, BP Cuff Size: Large adult)   Pulse (P) 60   Temp (P) 36.9 °C (98.4 °F) (Temporal)   Ht (P) 1.549 m (5' 1\")   Wt (P) 87.7 kg (193 lb 4.8 oz)   SpO2 (P) 95%   BMI (P) 36.52 kg/m²  Body mass index is 36.52 kg/m² (pended).    Gen: Alert and oriented, No apparent distress.  Neck: Neck is supple without " lymphadenopathy.  Lungs: Normal effort, CTA bilaterally, no wheezes, rhonchi, or rales  CV: Regular rate and rhythm. No murmurs, rubs, or gallops.  Ext: No clubbing, cyanosis, edema.    Labs: None    Assessment & Plan:     77 y.o. female with the following -     Problem List Items Addressed This Visit       Dysplastic cerebellar gangliocytoma (HCC) (Chronic)     Patient is following up with neurology and recently saw them last week.  MRI pending.         JIMMY (generalized anxiety disorder) (Chronic)     Patient is on clonazepam 1 mg nightly.  She was weaned off of higher dose and patient would like to remain on this current dose as she experiences shakiness during previous weaning attempts.  Refilled clonazepam today.  Counseled patient on risks of falls given her multiple medications that have side effects of drowsiness.  Patient understood risks but would like to remain on her current medications.         Relevant Medications    clonazePAM (KLONOPIN) 1 MG Tab    traZODone (DESYREL) 100 MG Tab    Chronic pain     Currently on tizanidine 4 mg every 6 hours as needed which patient reports she is taking 1 tablet once a day.  She is requesting a refill today.         Relevant Medications    gabapentin (NEURONTIN) 800 MG tablet    clonazePAM (KLONOPIN) 1 MG Tab    ibuprofen (MOTRIN) 800 MG Tab    tizanidine (ZANAFLEX) 4 MG Tab    traZODone (DESYREL) 100 MG Tab    Restless leg syndrome     Patient has a history of restless leg syndrome and is currently on Ropinirole 1 mg 3 times daily.  She is requesting an increase to 4 times daily as her symptoms are worsening. She is experiencing restlessness during the day as well. Increased Ropinerole to 4 times daily.         Hypertension     Currently on Propanolol and lisinopril.  BP today was 122/72.  Refilled medications.          Relevant Medications    lisinopril (PRINIVIL) 20 MG Tab    propranolol (INDERAL) 40 MG Tab    Neuropathy     Patient has chronic neuropathy which has  been worsening.  She was seen by a neurologist last week who increased her gabapentin to 1200 mg 3 times daily.  She is requesting a refill of her medication as he did not provide her with a refill. Refilled medication today and counseled patient on risks of fall with Gabapentin.         Relevant Medications    gabapentin (NEURONTIN) 800 MG tablet    ROPINIRole (REQUIP) 1 MG Tab    clonazePAM (KLONOPIN) 1 MG Tab    tizanidine (ZANAFLEX) 4 MG Tab    traZODone (DESYREL) 100 MG Tab     Other Visit Diagnoses       Polyneuropathic pain        Relevant Medications    gabapentin (NEURONTIN) 800 MG tablet    Other Relevant Orders    Referral to Neurology    Colon cancer screening        Relevant Orders    COLOGUARD (FIT DNA)    Osteoporosis screening        Tremors of nervous system        Relevant Medications    propranolol (INDERAL) 40 MG Tab    Cholecalciferol (VITAMIN D) 50 MCG (2000 UT) Cap    Depression        Relevant Medications    traZODone (DESYREL) 100 MG Tab    Cholecalciferol (VITAMIN D) 50 MCG (2000 UT) Cap            I spent a total of 30 minutes with record review, exam, communication with the patient, communication with other providers, and documentation of this encounter.      Return in about 3 months (around 11/14/2023).

## 2023-08-15 ENCOUNTER — TELEPHONE (OUTPATIENT)
Dept: HEALTH INFORMATION MANAGEMENT | Facility: OTHER | Age: 78
End: 2023-08-15
Payer: MEDICARE

## 2023-08-15 NOTE — ASSESSMENT & PLAN NOTE
Patient has a history of restless leg syndrome and is currently on Ropinirole 1 mg 3 times daily.  She is requesting an increase to 4 times daily as her symptoms are worsening. She is experiencing restlessness during the day as well. Increased Ropinerole to 4 times daily.

## 2023-08-15 NOTE — ASSESSMENT & PLAN NOTE
Currently on tizanidine 4 mg every 6 hours as needed which patient reports she is taking 1 tablet once a day.  She is requesting a refill today.

## 2023-08-15 NOTE — ASSESSMENT & PLAN NOTE
Patient has chronic neuropathy which has been worsening.  She was seen by a neurologist last week who increased her gabapentin to 1200 mg 3 times daily.  She is requesting a refill of her medication as he did not provide her with a refill. Refilled medication today and counseled patient on risks of fall with Gabapentin.

## 2023-08-15 NOTE — ASSESSMENT & PLAN NOTE
Patient is on clonazepam 1 mg nightly.  She was weaned off of higher dose and patient would like to remain on this current dose as she experiences shakiness during previous weaning attempts.  Refilled clonazepam today.  Counseled patient on risks of falls given her multiple medications that have side effects of drowsiness.  Patient understood risks but would like to remain on her current medications.

## 2023-09-01 ENCOUNTER — APPOINTMENT (OUTPATIENT)
Dept: RADIOLOGY | Facility: MEDICAL CENTER | Age: 78
End: 2023-09-01
Attending: FAMILY MEDICINE
Payer: MEDICARE

## 2023-09-05 ENCOUNTER — APPOINTMENT (OUTPATIENT)
Dept: MEDICAL GROUP | Facility: CLINIC | Age: 78
End: 2023-09-05
Payer: MEDICARE

## 2023-10-02 ENCOUNTER — TELEPHONE (OUTPATIENT)
Dept: HEALTH INFORMATION MANAGEMENT | Facility: OTHER | Age: 78
End: 2023-10-02
Payer: MEDICARE
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
## 2023-10-05 NOTE — PROGRESS NOTES
Per caregiver, patient had a fall at home and is now having right knee pain. Patient did not hit her head during the fall and has been acting like her usual self other than not being able to ambulate well. Caregiver is requesting a walker. Will put in a DME order for walker and right knee x-ray.

## 2023-10-06 ENCOUNTER — HOSPITAL ENCOUNTER (OUTPATIENT)
Dept: RADIOLOGY | Facility: MEDICAL CENTER | Age: 78
End: 2023-10-06
Payer: MEDICARE

## 2023-10-10 ENCOUNTER — HOSPITAL ENCOUNTER (OUTPATIENT)
Dept: RADIOLOGY | Facility: MEDICAL CENTER | Age: 78
End: 2023-10-10
Payer: MEDICARE
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
## 2023-10-10 PROCEDURE — 73564 X-RAY EXAM KNEE 4 OR MORE: CPT | Mod: RT

## 2023-10-24 RX ORDER — LISINOPRIL 20 MG/1
20 TABLET ORAL 2 TIMES DAILY
Qty: 180 TABLET | Refills: 3 | Status: SHIPPED | OUTPATIENT
Start: 2023-10-24 | End: 2024-02-15

## 2023-11-29 DIAGNOSIS — R25.1 TREMORS OF NERVOUS SYSTEM: ICD-10-CM

## 2023-11-30 RX ORDER — TRAZODONE HYDROCHLORIDE 100 MG/1
100 TABLET ORAL NIGHTLY
Qty: 90 TABLET | Refills: 3 | Status: SHIPPED | OUTPATIENT
Start: 2023-11-30

## 2023-11-30 RX ORDER — PROPRANOLOL HYDROCHLORIDE 40 MG/1
40 TABLET ORAL 2 TIMES DAILY
Qty: 200 TABLET | Refills: 3 | Status: SHIPPED | OUTPATIENT
Start: 2023-11-30

## 2023-12-21 RX ORDER — IBUPROFEN 800 MG/1
TABLET ORAL
Qty: 50 TABLET | Refills: 3 | OUTPATIENT
Start: 2023-12-21

## 2024-01-16 DIAGNOSIS — M79.2 POLYNEUROPATHIC PAIN: ICD-10-CM

## 2024-01-16 NOTE — TELEPHONE ENCOUNTER
Received request via: Pharmacy    Was the patient seen in the last year in this department? Yes    Does the patient have an active prescription (recently filled or refills available) for medication(s) requested? No    Does the patient have [facility] Plus and need 100 day supply (blood pressure, diabetes and cholesterol meds only)? yes

## 2024-01-17 ENCOUNTER — APPOINTMENT (OUTPATIENT)
Dept: RADIOLOGY | Facility: MEDICAL CENTER | Age: 79
End: 2024-01-17
Attending: FAMILY MEDICINE
Payer: MEDICARE

## 2024-01-17 NOTE — TELEPHONE ENCOUNTER
Patient message:    Good morning Dr. Graves,  Happy New Year!    I'm enclosing the appt. cancellation that was cancelled by the Imaging dept. at [place] because the orginal order that You placed in August of last year was not the order that they were going to follow this Wednesday, the 17th.   The order they were following did not include the Ultra Sound    They asked me to contact back to [facility]) and have them send to [facility] [place] a new order for a Diagnostic Mammogram AND a Diagnostic Ultra Sound, then the imaging dept would re-schedule once they got the order.  If You can expeditite this new order, much appreciated!    Hope this message gets through to You, I can not message this to You or Dr. Lucia on [facility].    Please let me know ASA so Katt can get re-scheduled.    Thank You & Take Care!    Adalid Abad,c/o Katt Abad, [birth year]

## 2024-01-18 DIAGNOSIS — N64.4 BREAST TENDERNESS: ICD-10-CM

## 2024-01-18 DIAGNOSIS — M79.2 POLYNEUROPATHIC PAIN: ICD-10-CM

## 2024-01-18 RX ORDER — GABAPENTIN 800 MG/1
800 TABLET ORAL 4 TIMES DAILY
Qty: 360 TABLET | OUTPATIENT
Start: 2024-01-18

## 2024-01-18 RX ORDER — GABAPENTIN 800 MG/1
1200 TABLET ORAL 3 TIMES DAILY
Qty: 135 TABLET | Refills: 11 | Status: SHIPPED | OUTPATIENT
Start: 2024-01-18 | End: 2025-01-12

## 2024-01-24 ENCOUNTER — APPOINTMENT (OUTPATIENT)
Dept: RADIOLOGY | Facility: MEDICAL CENTER | Age: 79
End: 2024-01-24
Attending: FAMILY MEDICINE
Payer: MEDICARE

## 2024-01-31 ENCOUNTER — HOSPITAL ENCOUNTER (OUTPATIENT)
Dept: RADIOLOGY | Facility: MEDICAL CENTER | Age: 79
End: 2024-01-31
Attending: FAMILY MEDICINE
Payer: MEDICARE

## 2024-02-14 DIAGNOSIS — Z13.9 SCREENING DUE: ICD-10-CM

## 2024-02-15 RX ORDER — LISINOPRIL 20 MG/1
20 TABLET ORAL 2 TIMES DAILY
Qty: 200 TABLET | Refills: 3 | Status: SHIPPED | OUTPATIENT
Start: 2024-02-15 | End: 2024-03-21 | Stop reason: SDUPTHER

## 2024-02-19 DIAGNOSIS — I63.9 CEREBRAL INFARCTION, UNSPECIFIED MECHANISM (HCC): ICD-10-CM

## 2024-02-20 ENCOUNTER — HOSPITAL ENCOUNTER (OUTPATIENT)
Dept: RADIOLOGY | Facility: MEDICAL CENTER | Age: 79
End: 2024-02-20
Attending: FAMILY MEDICINE
Payer: MEDICARE

## 2024-03-04 NOTE — TELEPHONE ENCOUNTER
Received request via: Patient    Was the patient seen in the last year in this department? Yes    Does the patient have an active prescription (recently filled or refills available) for medication(s) requested? No    [facility]    Does the patient have [facility] and need 100 day supply (blood pressure, diabetes and cholesterol meds only)? Medication is not for cholesterol, blood pressure or diabetes

## 2024-03-06 NOTE — TELEPHONE ENCOUNTER
Received request via: Patient    Was the patient seen in the last year in this department? Yes    Does the patient have an active prescription (recently filled or refills available) for medication(s) requested? No    [facility]    Does the patient have [facility] Plus and need 100 day supply (blood pressure, diabetes and cholesterol meds only)? Medication is not for cholesterol, blood pressure or diabetes

## 2024-03-07 NOTE — TELEPHONE ENCOUNTER
Received request via: Pharmacy    Was the patient seen in the last year in this department? Yes    Does the patient have an active prescription (recently filled or refills available) for medication(s) requested? No    Pharmacy Name: [facility]

## 2024-03-10 DIAGNOSIS — F41.1 GENERALIZED ANXIETY DISORDER: Chronic | ICD-10-CM

## 2024-03-10 RX ORDER — TIZANIDINE 4 MG/1
4 TABLET ORAL EVERY 6 HOURS PRN
Qty: 270 TABLET | Refills: 3 | Status: SHIPPED | OUTPATIENT
Start: 2024-03-10

## 2024-03-10 RX ORDER — IBUPROFEN 800 MG/1
800 TABLET ORAL EVERY 8 HOURS PRN
Qty: 50 TABLET | Refills: 3 | Status: SHIPPED | OUTPATIENT
Start: 2024-03-10

## 2024-03-10 RX ORDER — METRONIDAZOLE 7.5 MG/G
1 GEL TOPICAL 2 TIMES DAILY
Qty: 45 G | Refills: 1 | Status: SHIPPED | OUTPATIENT
Start: 2024-03-10

## 2024-03-10 RX ORDER — CLONAZEPAM 1 MG/1
1 TABLET ORAL NIGHTLY
Qty: 30 TABLET | Refills: 0 | Status: SHIPPED | OUTPATIENT
Start: 2024-03-10 | End: 2024-03-18 | Stop reason: SDUPTHER

## 2024-03-11 ENCOUNTER — TELEPHONE (OUTPATIENT)
Dept: MEDICAL GROUP | Facility: CLINIC | Age: 79
End: 2024-03-11
Payer: MEDICARE

## 2024-03-11 NOTE — TELEPHONE ENCOUNTER
Dr. Lucia your patients spouse called and states patient needs Klonopin refill to [facility] in [place] as soon as possible please.  Thank you

## 2024-03-14 ENCOUNTER — APPOINTMENT (OUTPATIENT)
Dept: MEDICAL GROUP | Facility: CLINIC | Age: 79
End: 2024-03-14
Payer: MEDICARE

## 2024-03-18 ENCOUNTER — OFFICE VISIT (OUTPATIENT)
Dept: MEDICAL GROUP | Facility: CLINIC | Age: 79
End: 2024-03-18
Payer: MEDICARE

## 2024-03-18 VITALS
DIASTOLIC BLOOD PRESSURE: 91 MMHG | BODY MASS INDEX: 35.87 KG/M2 | SYSTOLIC BLOOD PRESSURE: 189 MMHG | OXYGEN SATURATION: 90 % | TEMPERATURE: 98.4 F | HEIGHT: 61 IN | WEIGHT: 190 LBS | HEART RATE: 67 BPM

## 2024-03-18 DIAGNOSIS — F41.1 GENERALIZED ANXIETY DISORDER: Chronic | ICD-10-CM

## 2024-03-18 DIAGNOSIS — I10 PRIMARY HYPERTENSION: ICD-10-CM

## 2024-03-18 DIAGNOSIS — M54.2 CHRONIC NECK PAIN: ICD-10-CM

## 2024-03-18 DIAGNOSIS — G89.29 CHRONIC NECK PAIN: ICD-10-CM

## 2024-03-18 PROCEDURE — 3080F DIAST BP >= 90 MM HG: CPT | Mod: GC

## 2024-03-18 PROCEDURE — 99213 OFFICE O/P EST LOW 20 MIN: CPT | Mod: GE

## 2024-03-18 PROCEDURE — 3077F SYST BP >= 140 MM HG: CPT | Mod: GC

## 2024-03-18 RX ORDER — DIAZEPAM 5 MG/1
5 TABLET ORAL EVERY 6 HOURS PRN
Qty: 1 TABLET | Refills: 0 | Status: CANCELLED | OUTPATIENT
Start: 2024-03-18 | End: 2024-04-29

## 2024-03-18 RX ORDER — DIAZEPAM 5 MG/1
5 TABLET ORAL
Qty: 1 TABLET | Refills: 0 | Status: SHIPPED | OUTPATIENT
Start: 2024-03-18 | End: 2024-04-29

## 2024-03-18 RX ORDER — CLONAZEPAM 1 MG/1
1 TABLET ORAL NIGHTLY
Qty: 30 TABLET | Refills: 3 | Status: SHIPPED | OUTPATIENT
Start: 2024-04-09 | End: 2024-08-07

## 2024-03-18 NOTE — PROGRESS NOTES
Subjective:     CC: Neck pain    HPI:   LISA with pmhx JIMMY, dysplastic cerebellar gangliocytoma, chronic pain, restless leg syndrome, hypertension who presents today with:    Problem   Chronic Neck Pain    Patient reports chronic neck pain that is radiating to her left arm for the past 4-5 months.  She does have a history of 3 cervical fusions.  The patient states that prior to her fusions, she was experiencing similar pain which was worse and then improved with surgery.  The patient's pain radiates from her neck to her shoulder and down her bicep into her arms and fingers.  The patient experiences numbness and tingling in all 5 fingers on her left side.  She also has weakness on that side secondary to pain.     Hypertension    Patient has a history of high blood pressure. She is currently on Lisinopril and Propanolol both two times daily. Patient has been compliant with her medications. She reports that her pressures at home are usually 130's/80's maximum. BP today was initially 189/91 and 154/81 on repeat.         Current Outpatient Medications Ordered in Epic   Medication Sig Dispense Refill    diazePAM (VALIUM) 5 MG Tab Take 1 Tablet by mouth one time as needed for Anxiety (Prior to MRI) for up to 1 dose. 1 Tablet 0    [START ON 4/9/2024] clonazePAM (KLONOPIN) 1 MG Tab Take 1 Tablet by mouth every evening for 120 days. 30 Tablet 3    ibuprofen (MOTRIN) 800 MG Tab TAKE 1 TABLET BY MOUTH EVERY 8 HOURS AS NEEDED FOR PAIN 50 Tablet 3    tizanidine (ZANAFLEX) 4 MG Tab TAKE 1 TABLET BY MOUTH EVERY 6 HOURS AS NEEDED FOR SPASM 270 Tablet 3    metronidazole (METROGEL) 0.75 % gel APPLY TOPICALLY TO THE AFFECTED AREA TWICE DAILY 45 g 1    lisinopril (PRINIVIL) 20 MG Tab Take 1 Tablet by mouth 2 times a day. 200 Tablet 3    gabapentin (NEURONTIN) 800 MG tablet Take 1.5 Tablets by mouth 3 times a day for 360 days. 135 Tablet 11    propranolol (INDERAL) 40 MG Tab Take 1 Tablet by mouth 2 times a day. 200 Tablet 3     "traZODone (DESYREL) 100 MG Tab Take 1 Tablet by mouth every evening. 90 Tablet 3    Cholecalciferol (VITAMIN D3) 2000 UNIT Cap TAKE 1 CAPSULE BY MOUTH EVERY DAY 90 Capsule 3    ROPINIRole (REQUIP) 1 MG Tab Take 1 Tablet by mouth 4 times a day for 360 days. 120 Tablet 11    diclofenac sodium (VOLTAREN) 1 % Gel Apply 2 g topically 4 times a day as needed (for joint pain). 360 g 11    Coenzyme Q10 100 MG Cap Take 1 Cap by mouth every day.      Probiotic Product (PROBIOTIC DAILY PO) Take 1 Cap by mouth every day.      Non Formulary Request Take 1 Tab by mouth 3 times a day. **BETA TCD** for gallbladder      Non Formulary Request Take 30 Drops by mouth 3 times a day. **SUPER PHOSPHATE LIQ**      Non Formulary Request Take 1 Tab by mouth every bedtime. **MAGNESIUM AND POTASSIUM ASPARTATE**       No current Epic-ordered facility-administered medications on file.       ROS:  ROS as per HPI. Otherwise negative.      Objective:     Exam:  BP (!) 189/91 (BP Location: Right arm, Patient Position: Sitting, BP Cuff Size: Adult)   Pulse 67   Temp 36.9 °C (98.4 °F) (Temporal)   Ht 1.549 m (5' 1\")   Wt 86.2 kg (190 lb)   SpO2 90%   BMI 35.90 kg/m²  Body mass index is 35.9 kg/m².    Gen: Alert and oriented, No apparent distress.  HEENT: Limited extension, flexion, and left to right motion of neck.  Normal TM's. Mucous membranes moist and clear. Neck is supple without lymphadenopathy.  Lungs: Normal effort, CTA bilaterally, no wheezes, rhonchi, or rales  CV: Regular rate and rhythm. No murmurs, rubs, or gallops.  Ext: Limited range of motion of left upper extremity with extension, flexion, abduction, abduction, and external rotation.  Positive Nola, Neer, Adams.  4/5 sensation in left upper extremity compared to 5/5 on right.  Abdomen: No abdominal tenderness. No visible or palpable masses. No rebound or guarding.    Labs: None    Assessment & Plan:     78 y.o. female with the following -     Problem List Items Addressed This " Visit       JIMMY (generalized anxiety disorder) (Chronic)    Relevant Medications    diazePAM (VALIUM) 5 MG Tab    clonazePAM (KLONOPIN) 1 MG Tab (Start on 4/9/2024)    Hypertension     Advised patient to continue checking her blood pressures at home and continue taking her Lisinopril and Propanalol.         Chronic neck pain     Recommend a trial of physical therapy and imaging of her cervical and thoracic spine.  Patient is requesting Valium for her MRIs as she has previously required sedation due to claustrophobia.  Will give her a one-time dose of Valium 5 mg to be taken prior to cervical MRI.         Relevant Medications    diazePAM (VALIUM) 5 MG Tab    clonazePAM (KLONOPIN) 1 MG Tab (Start on 4/9/2024)    Other Relevant Orders    Referral to Physical Therapy         Return in about 4 weeks (around 4/15/2024).

## 2024-03-19 NOTE — ASSESSMENT & PLAN NOTE
Recommend a trial of physical therapy and imaging of her cervical and thoracic spine.  Patient is requesting Valium for her MRIs as she has previously required sedation due to claustrophobia.  Will give her a one-time dose of Valium 5 mg to be taken prior to cervical MRI.

## 2024-03-19 NOTE — ASSESSMENT & PLAN NOTE
Advised patient to continue checking her blood pressures at home and continue taking her Lisinopril and Propanalol.

## 2024-03-21 NOTE — TELEPHONE ENCOUNTER
Received request via: Pharmacy    Was the patient seen in the last year in this department? Yes    Does the patient have an active prescription (recently filled or refills available) for medication(s) requested? No    [facility]    Does the patient have [facility] Plus and need 100 day supply (blood pressure, diabetes and cholesterol meds only)? Patient does not have SCP

## 2024-03-22 RX ORDER — LISINOPRIL 20 MG/1
20 TABLET ORAL 2 TIMES DAILY
Qty: 200 TABLET | Refills: 3 | Status: SHIPPED | OUTPATIENT
Start: 2024-03-22

## 2024-03-29 ENCOUNTER — TELEPHONE (OUTPATIENT)
Dept: HEALTH INFORMATION MANAGEMENT | Facility: OTHER | Age: 79
End: 2024-03-29
Payer: MEDICARE

## 2024-04-22 ENCOUNTER — APPOINTMENT (OUTPATIENT)
Dept: RADIOLOGY | Facility: MEDICAL CENTER | Age: 79
End: 2024-04-22
Payer: MEDICARE

## 2024-05-03 RX ORDER — LISINOPRIL 20 MG/1
20 TABLET ORAL 2 TIMES DAILY
Qty: 200 TABLET | Refills: 3 | Status: SHIPPED | OUTPATIENT
Start: 2024-05-03

## 2024-05-03 NOTE — TELEPHONE ENCOUNTER
Received request via: Pharmacy    Was the patient seen in the last year in this department? Yes    Does the patient have an active prescription (recently filled or refills available) for medication(s) requested? No    [facility]    Does the patient have [facility] and need 100 day supply (blood pressure, diabetes and cholesterol meds only)? Patient does not have SCP

## 2024-05-15 ENCOUNTER — APPOINTMENT (OUTPATIENT)
Dept: LAB | Facility: MEDICAL CENTER | Age: 79
End: 2024-05-15
Payer: MEDICARE

## 2024-06-03 ENCOUNTER — APPOINTMENT (OUTPATIENT)
Dept: RADIOLOGY | Facility: MEDICAL CENTER | Age: 79
End: 2024-06-03
Payer: MEDICARE

## 2024-06-06 ENCOUNTER — APPOINTMENT (OUTPATIENT)
Dept: LAB | Facility: MEDICAL CENTER | Age: 79
End: 2024-06-06
Payer: MEDICARE

## 2024-08-06 ENCOUNTER — OFFICE VISIT (OUTPATIENT)
Dept: MEDICAL GROUP | Facility: CLINIC | Age: 79
End: 2024-08-06
Payer: MEDICARE

## 2024-08-06 VITALS
WEIGHT: 189 LBS | OXYGEN SATURATION: 92 % | SYSTOLIC BLOOD PRESSURE: 161 MMHG | DIASTOLIC BLOOD PRESSURE: 83 MMHG | HEIGHT: 61 IN | TEMPERATURE: 96.9 F | HEART RATE: 56 BPM | BODY MASS INDEX: 35.68 KG/M2

## 2024-08-06 DIAGNOSIS — G62.9 NEUROPATHY: ICD-10-CM

## 2024-08-06 DIAGNOSIS — G89.4 CHRONIC PAIN SYNDROME: ICD-10-CM

## 2024-08-06 DIAGNOSIS — F41.1 GENERALIZED ANXIETY DISORDER: Chronic | ICD-10-CM

## 2024-08-06 DIAGNOSIS — E66.9 OBESITY (BMI 30-39.9): ICD-10-CM

## 2024-08-06 DIAGNOSIS — I10 PRIMARY HYPERTENSION: ICD-10-CM

## 2024-08-06 DIAGNOSIS — R25.1 TREMORS OF NERVOUS SYSTEM: ICD-10-CM

## 2024-08-06 DIAGNOSIS — G25.81 RESTLESS LEG SYNDROME: ICD-10-CM

## 2024-08-06 PROCEDURE — 3077F SYST BP >= 140 MM HG: CPT | Mod: GC

## 2024-08-06 PROCEDURE — 99214 OFFICE O/P EST MOD 30 MIN: CPT | Mod: GC

## 2024-08-06 PROCEDURE — 3079F DIAST BP 80-89 MM HG: CPT | Mod: GC

## 2024-08-06 RX ORDER — PROPRANOLOL HYDROCHLORIDE 40 MG/1
40 TABLET ORAL 2 TIMES DAILY
Qty: 200 TABLET | Refills: 3 | Status: SHIPPED | OUTPATIENT
Start: 2024-08-06

## 2024-08-06 RX ORDER — AMLODIPINE BESYLATE 5 MG/1
5 TABLET ORAL DAILY
Qty: 90 TABLET | Refills: 3 | Status: SHIPPED | OUTPATIENT
Start: 2024-08-06

## 2024-08-06 RX ORDER — IBUPROFEN 800 MG/1
800 TABLET, FILM COATED ORAL EVERY 8 HOURS PRN
Qty: 50 TABLET | Refills: 3 | Status: SHIPPED | OUTPATIENT
Start: 2024-08-06

## 2024-08-06 RX ORDER — CLONAZEPAM 1 MG/1
1 TABLET ORAL NIGHTLY
Qty: 30 TABLET | Refills: 3 | Status: SHIPPED | OUTPATIENT
Start: 2024-08-06 | End: 2024-12-04

## 2024-08-06 RX ORDER — GABAPENTIN 800 MG/1
800 TABLET ORAL 2 TIMES DAILY
Qty: 180 TABLET | Refills: 3 | Status: SHIPPED | OUTPATIENT
Start: 2024-08-06

## 2024-08-06 RX ORDER — TRAZODONE HYDROCHLORIDE 100 MG/1
100 TABLET ORAL NIGHTLY
Qty: 90 TABLET | Refills: 3 | Status: SHIPPED | OUTPATIENT
Start: 2024-08-06

## 2024-08-06 RX ORDER — ROPINIROLE 1 MG/1
1 TABLET, FILM COATED ORAL 4 TIMES DAILY
Qty: 120 TABLET | Refills: 11 | Status: SHIPPED | OUTPATIENT
Start: 2024-08-06 | End: 2025-08-01

## 2024-08-06 NOTE — ASSESSMENT & PLAN NOTE
Start patient on amlodipine 5 mg daily.  CMP is already ordered and renal ultrasound ordered today to assess for secondary causes of hypertension.  Patient will follow-up after labs and imaging are completed.

## 2024-08-06 NOTE — PROGRESS NOTES
Subjective:     CC: Medication refill    HPI:   Jessica with restless leg syndrome, hypertension, fibromyalgia, chronic back pain who presents today with:    Problem   Neuropathy    Patient is currently taking Gabapentin 800mg twice daily. She has weaned herself off her higher dose and is tolerating the lower dose well.     Restless Leg Syndrome    Patient is requesting a refill of her Ropinerole.  She is taking 2 tablets in the morning and 2 tablets at night.  Patient reports improvement in her symptoms with her current regimen and has tried lower doses in the past but is unable to sit during the day or sleep at night with lower doses.     Hypertension    3/18/24: Patient has a history of high blood pressure. She is currently on Lisinopril and Propanolol both two times daily. Patient has been compliant with her medications. She reports that her pressures at home are usually 130's/80's maximum. BP today was initially 189/91 and 154/81 on repeat.    8/6/24: Patient has been compliant with her lisinopril and propranolol.  Her BP today is elevated at 161/83.  She does check her blood pressures at home and they have been in the 150-160/80s-90 range.     JIMMY (generalized anxiety disorder)    Patient is currently on Klonopin for anxiety and is taking it nightly. She has been on it for several years. She has tried to wean in the past but has not tolerated lower doses with symptoms of withdrawal.         Current Outpatient Medications Ordered in Epic   Medication Sig Dispense Refill    traZODone (DESYREL) 100 MG Tab Take 1 Tablet by mouth every evening. 90 Tablet 3    tizanidine (ZANAFLEX) 4 MG Tab Take 1 Tablet by mouth every 6 hours as needed (For back pain). for spasm 270 Tablet 3    ROPINIRole (REQUIP) 1 MG Tab Take 1 Tablet by mouth 4 times a day for 360 days. 120 Tablet 11    propranolol (INDERAL) 40 MG Tab Take 1 Tablet by mouth 2 times a day. 200 Tablet 3    ibuprofen (MOTRIN) 800 MG Tab Take 1 Tablet by mouth  "every 8 hours as needed for Mild Pain. for pain 50 Tablet 3    amLODIPine (NORVASC) 5 MG Tab Take 1 Tablet by mouth every day. 90 Tablet 3    gabapentin (NEURONTIN) 800 MG tablet Take 1 Tablet by mouth 2 times a day. 180 Tablet 3    clonazePAM (KLONOPIN) 1 MG Tab Take 1 Tablet by mouth every evening for 120 days. 30 Tablet 3    metronidazole (METROGEL) 0.75 % gel APPLY TOPICALLY TO THE AFFECTED AREA TWICE DAILY 45 g 1    Cholecalciferol (VITAMIN D3) 2000 UNIT Cap TAKE 1 CAPSULE BY MOUTH EVERY DAY 90 Capsule 3    diclofenac sodium (VOLTAREN) 1 % Gel Apply 2 g topically 4 times a day as needed (for joint pain). 360 g 11    Coenzyme Q10 100 MG Cap Take 1 Cap by mouth every day.      Probiotic Product (PROBIOTIC DAILY PO) Take 1 Cap by mouth every day.      Non Formulary Request Take 1 Tab by mouth 3 times a day. **BETA TCD** for gallbladder      Non Formulary Request Take 30 Drops by mouth 3 times a day. **SUPER PHOSPHATE LIQ**      Non Formulary Request Take 1 Tab by mouth every bedtime. **MAGNESIUM AND POTASSIUM ASPARTATE**       No current Epic-ordered facility-administered medications on file.       ROS:  ROS as per HPI. Otherwise negative.      Objective:     Exam:  BP (!) 161/83 (BP Location: Right arm, Patient Position: Sitting, BP Cuff Size: Large adult)   Pulse (!) 56   Temp 36.1 °C (96.9 °F) (Temporal)   Ht 1.549 m (5' 1\")   Wt 85.7 kg (189 lb)   SpO2 92%   BMI 35.71 kg/m²  Body mass index is 35.71 kg/m².    Gen: Alert and oriented, No apparent distress.  HEENT: Normal TM's. Mucous membranes moist and clear. Neck is supple without lymphadenopathy.  Lungs: Normal effort, CTA bilaterally, no wheezes, rhonchi, or rales  CV: Regular rate and rhythm. No murmurs, rubs, or gallops.  Ext: No clubbing, cyanosis, edema.  Abdomen: No abdominal tenderness. No visible or palpable masses. No rebound or guarding.    Assessment & Plan:     78 y.o. female with the following -     Problem List Items Addressed This Visit  "      Chronic pain    Relevant Medications    traZODone (DESYREL) 100 MG Tab    tizanidine (ZANAFLEX) 4 MG Tab    ibuprofen (MOTRIN) 800 MG Tab    gabapentin (NEURONTIN) 800 MG tablet    clonazePAM (KLONOPIN) 1 MG Tab    Other Relevant Orders    Referral to Massage Therapy    Restless leg syndrome     Patient will attempt taking one tablet in morning and 2 tablets at night.  Counseled patient on risks of higher dose of ropinirole and increased side effects.  Patient understand risks and is willing to try a lower dose however, she is requesting that her prescription not be changed.         JIMMY (generalized anxiety disorder) (Chronic)     Refilled Klonopin today. Extensively discussed risks of medication especially in the elderly such as falls, mental status change, and confusion. Patient understands risks and would like to stay on current dose.         Relevant Medications    traZODone (DESYREL) 100 MG Tab    clonazePAM (KLONOPIN) 1 MG Tab    Hypertension     Start patient on amlodipine 5 mg daily.  CMP is already ordered and renal ultrasound ordered today to assess for secondary causes of hypertension.  Patient will follow-up after labs and imaging are completed.         Relevant Medications    propranolol (INDERAL) 40 MG Tab    amLODIPine (NORVASC) 5 MG Tab    Other Relevant Orders    US-RENAL ARTERY DUPLEX COMP    Neuropathy     Refilled Gabapentin at 800mg twice daily.         Relevant Medications    traZODone (DESYREL) 100 MG Tab    tizanidine (ZANAFLEX) 4 MG Tab    ROPINIRole (REQUIP) 1 MG Tab    gabapentin (NEURONTIN) 800 MG tablet    clonazePAM (KLONOPIN) 1 MG Tab     Other Visit Diagnoses       Tremors of nervous system        Relevant Medications    propranolol (INDERAL) 40 MG Tab    Obesity (BMI 30-39.9)        Relevant Orders    HEMOGLOBIN A1C              Return in about 4 weeks (around 9/3/2024).

## 2024-08-06 NOTE — ASSESSMENT & PLAN NOTE
Patient will attempt taking one tablet in morning and 2 tablets at night.  Counseled patient on risks of higher dose of ropinirole and increased side effects.  Patient understand risks and is willing to try a lower dose however, she is requesting that her prescription not be changed.

## 2024-08-06 NOTE — ASSESSMENT & PLAN NOTE
Refilled Klonopin today. Extensively discussed risks of medication especially in the elderly such as falls, mental status change, and confusion. Patient understands risks and would like to stay on current dose.

## 2024-09-04 DIAGNOSIS — G89.4 CHRONIC PAIN SYNDROME: ICD-10-CM

## 2024-09-04 NOTE — TELEPHONE ENCOUNTER
Received request via: Pharmacy    Was the patient seen in the last year in this department? Yes    Does the patient have an active prescription (recently filled or refills available) for medication(s) requested? No    Pharm[facility]   [id number] - [place] - [address] AT [facility] [place] [address]

## 2024-09-11 ENCOUNTER — APPOINTMENT (OUTPATIENT)
Dept: LAB | Facility: MEDICAL CENTER | Age: 79
End: 2024-09-11
Payer: MEDICARE

## 2024-09-16 DIAGNOSIS — R25.1 TREMORS OF NERVOUS SYSTEM: ICD-10-CM

## 2024-09-16 NOTE — TELEPHONE ENCOUNTER
Received request via: Pharmacy    Was the patient seen in the last year in this department? Yes    Does the patient have an active prescription (recently filled or refills available) for medication(s) requested? No    Pharmacy Name:   [id number] - [place], [place] - [address] AT [facility] [place]

## 2024-09-17 RX ORDER — PROPRANOLOL HYDROCHLORIDE 40 MG/1
40 TABLET ORAL 2 TIMES DAILY
Qty: 180 TABLET | Refills: 3 | Status: SHIPPED | OUTPATIENT
Start: 2024-09-17

## 2024-10-02 RX ORDER — LISINOPRIL 20 MG/1
20 TABLET ORAL 2 TIMES DAILY
COMMUNITY

## 2024-10-02 RX ORDER — LISINOPRIL 20 MG/1
20 TABLET ORAL 2 TIMES DAILY
Qty: 30 TABLET | Refills: 0 | OUTPATIENT
Start: 2024-10-02

## 2024-10-15 RX ORDER — GABAPENTIN 800 MG/1
TABLET ORAL
Qty: 135 TABLET | Refills: 11 | Status: SHIPPED | OUTPATIENT
Start: 2024-10-15 | End: 2024-11-14

## 2024-10-18 ENCOUNTER — APPOINTMENT (OUTPATIENT)
Dept: LAB | Facility: MEDICAL CENTER | Age: 79
End: 2024-10-18
Payer: MEDICARE

## 2024-10-22 RX ORDER — IBUPROFEN 800 MG/1
TABLET, FILM COATED ORAL
Qty: 50 TABLET | Refills: 0 | Status: SHIPPED | OUTPATIENT
Start: 2024-10-22

## 2024-10-23 ENCOUNTER — APPOINTMENT (OUTPATIENT)
Dept: LAB | Facility: MEDICAL CENTER | Age: 79
End: 2024-10-23
Payer: MEDICARE

## 2024-10-30 ENCOUNTER — APPOINTMENT (OUTPATIENT)
Dept: LAB | Facility: MEDICAL CENTER | Age: 79
End: 2024-10-30
Payer: MEDICARE

## 2024-11-07 ENCOUNTER — APPOINTMENT (OUTPATIENT)
Dept: LAB | Facility: MEDICAL CENTER | Age: 79
End: 2024-11-07
Payer: MEDICARE

## 2024-11-08 ENCOUNTER — APPOINTMENT (OUTPATIENT)
Dept: LAB | Facility: MEDICAL CENTER | Age: 79
End: 2024-11-08
Payer: MEDICARE

## 2024-11-08 ENCOUNTER — APPOINTMENT (OUTPATIENT)
Dept: MEDICAL GROUP | Facility: CLINIC | Age: 79
End: 2024-11-08
Payer: MEDICARE

## 2024-11-15 ENCOUNTER — APPOINTMENT (OUTPATIENT)
Dept: MEDICAL GROUP | Facility: CLINIC | Age: 79
End: 2024-11-15
Payer: MEDICARE

## 2024-11-21 DIAGNOSIS — F41.1 GENERALIZED ANXIETY DISORDER: Chronic | ICD-10-CM

## 2024-11-21 RX ORDER — CLONAZEPAM 1 MG/1
1 TABLET ORAL NIGHTLY
Qty: 30 TABLET | Refills: 0 | Status: SHIPPED | OUTPATIENT
Start: 2024-11-21 | End: 2024-12-21

## 2024-12-03 ENCOUNTER — HOSPITAL ENCOUNTER (OUTPATIENT)
Dept: LAB | Facility: MEDICAL CENTER | Age: 79
End: 2024-12-03
Payer: MEDICARE

## 2024-12-03 ENCOUNTER — APPOINTMENT (OUTPATIENT)
Dept: MEDICAL GROUP | Facility: CLINIC | Age: 79
End: 2024-12-03
Payer: MEDICARE

## 2024-12-03 DIAGNOSIS — E66.9 OBESITY (BMI 30-39.9): ICD-10-CM

## 2024-12-03 DIAGNOSIS — Z13.9 SCREENING DUE: ICD-10-CM

## 2024-12-03 LAB
ALBUMIN SERPL BCP-MCNC: 4.4 G/DL (ref 3.2–4.9)
ALBUMIN/GLOB SERPL: 1.7 G/DL
ALP SERPL-CCNC: 106 U/L (ref 30–99)
ALT SERPL-CCNC: 7 U/L (ref 2–50)
ANION GAP SERPL CALC-SCNC: 11 MMOL/L (ref 7–16)
AST SERPL-CCNC: 16 U/L (ref 12–45)
BASOPHILS # BLD AUTO: 0.3 % (ref 0–1.8)
BASOPHILS # BLD: 0.03 K/UL (ref 0–0.12)
BILIRUB SERPL-MCNC: 0.4 MG/DL (ref 0.1–1.5)
BUN SERPL-MCNC: 10 MG/DL (ref 8–22)
CALCIUM ALBUM COR SERPL-MCNC: 9 MG/DL (ref 8.5–10.5)
CALCIUM SERPL-MCNC: 9.3 MG/DL (ref 8.5–10.5)
CHLORIDE SERPL-SCNC: 101 MMOL/L (ref 96–112)
CHOLEST SERPL-MCNC: 190 MG/DL (ref 100–199)
CO2 SERPL-SCNC: 28 MMOL/L (ref 20–33)
CREAT SERPL-MCNC: 0.65 MG/DL (ref 0.5–1.4)
EOSINOPHIL # BLD AUTO: 0.16 K/UL (ref 0–0.51)
EOSINOPHIL NFR BLD: 1.8 % (ref 0–6.9)
ERYTHROCYTE [DISTWIDTH] IN BLOOD BY AUTOMATED COUNT: 44.6 FL (ref 35.9–50)
EST. AVERAGE GLUCOSE BLD GHB EST-MCNC: 134 MG/DL
GFR SERPLBLD CREATININE-BSD FMLA CKD-EPI: 90 ML/MIN/1.73 M 2
GLOBULIN SER CALC-MCNC: 2.6 G/DL (ref 1.9–3.5)
GLUCOSE SERPL-MCNC: 90 MG/DL (ref 65–99)
HBA1C MFR BLD: 6.3 % (ref 4–5.6)
HCT VFR BLD AUTO: 43.7 % (ref 37–47)
HDLC SERPL-MCNC: 53 MG/DL
HGB BLD-MCNC: 14.2 G/DL (ref 12–16)
IMM GRANULOCYTES # BLD AUTO: 0.03 K/UL (ref 0–0.11)
IMM GRANULOCYTES NFR BLD AUTO: 0.3 % (ref 0–0.9)
LDLC SERPL CALC-MCNC: 114 MG/DL
LYMPHOCYTES # BLD AUTO: 2.78 K/UL (ref 1–4.8)
LYMPHOCYTES NFR BLD: 31.6 % (ref 22–41)
MCH RBC QN AUTO: 29.2 PG (ref 27–33)
MCHC RBC AUTO-ENTMCNC: 32.5 G/DL (ref 32.2–35.5)
MCV RBC AUTO: 89.9 FL (ref 81.4–97.8)
MONOCYTES # BLD AUTO: 0.55 K/UL (ref 0–0.85)
MONOCYTES NFR BLD AUTO: 6.3 % (ref 0–13.4)
NEUTROPHILS # BLD AUTO: 5.24 K/UL (ref 1.82–7.42)
NEUTROPHILS NFR BLD: 59.7 % (ref 44–72)
NRBC # BLD AUTO: 0 K/UL
NRBC BLD-RTO: 0 /100 WBC (ref 0–0.2)
PLATELET # BLD AUTO: 293 K/UL (ref 164–446)
PMV BLD AUTO: 11.3 FL (ref 9–12.9)
POTASSIUM SERPL-SCNC: 4.4 MMOL/L (ref 3.6–5.5)
PROT SERPL-MCNC: 7 G/DL (ref 6–8.2)
RBC # BLD AUTO: 4.86 M/UL (ref 4.2–5.4)
SODIUM SERPL-SCNC: 140 MMOL/L (ref 135–145)
TRIGL SERPL-MCNC: 117 MG/DL (ref 0–149)
TSH SERPL DL<=0.005 MIU/L-ACNC: 1.11 UIU/ML (ref 0.38–5.33)
WBC # BLD AUTO: 8.8 K/UL (ref 4.8–10.8)

## 2024-12-03 PROCEDURE — 80053 COMPREHEN METABOLIC PANEL: CPT

## 2024-12-03 PROCEDURE — 85025 COMPLETE CBC W/AUTO DIFF WBC: CPT

## 2024-12-03 PROCEDURE — 36415 COLL VENOUS BLD VENIPUNCTURE: CPT

## 2024-12-03 PROCEDURE — 83036 HEMOGLOBIN GLYCOSYLATED A1C: CPT

## 2024-12-03 PROCEDURE — 84443 ASSAY THYROID STIM HORMONE: CPT

## 2024-12-03 PROCEDURE — 80061 LIPID PANEL: CPT

## 2024-12-10 ENCOUNTER — TELEMEDICINE (OUTPATIENT)
Dept: MEDICAL GROUP | Facility: CLINIC | Age: 79
End: 2024-12-10
Payer: MEDICARE

## 2024-12-10 ENCOUNTER — APPOINTMENT (OUTPATIENT)
Dept: MEDICAL GROUP | Facility: CLINIC | Age: 79
End: 2024-12-10
Payer: MEDICARE

## 2024-12-10 DIAGNOSIS — F41.1 GENERALIZED ANXIETY DISORDER: Chronic | ICD-10-CM

## 2024-12-10 DIAGNOSIS — R73.03 PREDIABETES: ICD-10-CM

## 2024-12-10 PROCEDURE — 99213 OFFICE O/P EST LOW 20 MIN: CPT | Mod: 95,GE

## 2024-12-10 RX ORDER — CLONAZEPAM 1 MG/1
1 TABLET ORAL NIGHTLY
Qty: 30 TABLET | Refills: 0 | Status: SHIPPED | OUTPATIENT
Start: 2024-12-21 | End: 2025-01-20

## 2024-12-11 NOTE — PROGRESS NOTES
This evaluation was conducted via Teams using secure and encrypted videoconferencing technology. The patient was in their home in the [facility].    The patient's identity was confirmed and verbal consent was obtained for this virtual visit.     Subjective:     CC: Medication follow-up    HPI:   Jessica with chronic pain who presents today with:    Problem   JIMMY (generalized anxiety disorder)    Patient is requesting a refill of her Klonopin.  She reports no side effects and no recent falls.  Patient has tried to wean off of medications in the past but has not tolerated lower doses.         Current Outpatient Medications Ordered in Epic   Medication Sig Dispense Refill    [START ON 12/21/2024] clonazePAM (KLONOPIN) 1 MG Tab Take 1 Tablet by mouth every evening for 30 days. 30 Tablet 0    ibuprofen (MOTRIN) 800 MG Tab TAKE 1 TABLET BY MOUTH EVERY 8 HOURS AS NEEDED FOR MILD PAIN OR PAIN 50 Tablet 0    lisinopril (PRINIVIL) 20 MG Tab Take 20 mg by mouth 2 times a day.      propranolol (INDERAL) 40 MG Tab TAKE 1 TABLET BY MOUTH TWICE DAILY 180 Tablet 3    diclofenac sodium (VOLTAREN) 1 % Gel APPLY 2 GRAMS TOPICALLY TO THE AFFECTED AREA FOUR TIMES DAILY AS NEEDED 400 g 3    traZODone (DESYREL) 100 MG Tab Take 1 Tablet by mouth every evening. 90 Tablet 3    tizanidine (ZANAFLEX) 4 MG Tab Take 1 Tablet by mouth every 6 hours as needed (For back pain). for spasm 270 Tablet 3    ROPINIRole (REQUIP) 1 MG Tab Take 1 Tablet by mouth 4 times a day for 360 days. 120 Tablet 11    amLODIPine (NORVASC) 5 MG Tab Take 1 Tablet by mouth every day. 90 Tablet 3    metronidazole (METROGEL) 0.75 % gel APPLY TOPICALLY TO THE AFFECTED AREA TWICE DAILY 45 g 1    Cholecalciferol (VITAMIN D3) 2000 UNIT Cap TAKE 1 CAPSULE BY MOUTH EVERY DAY 90 Capsule 3    Coenzyme Q10 100 MG Cap Take 1 Cap by mouth every day.      Probiotic Product (PROBIOTIC DAILY PO) Take 1 Cap by mouth every day.      Non Formulary Request Take 1 Tab by mouth 3 times a  day. **BETA TCD** for gallbladder      Non Formulary Request Take 30 Drops by mouth 3 times a day. **SUPER PHOSPHATE LIQ**      Non Formulary Request Take 1 Tab by mouth every bedtime. **MAGNESIUM AND POTASSIUM ASPARTATE**       No current Epic-ordered facility-administered medications on file.       ROS:  ROS as per HPI. Otherwise negative.      Objective:     Exam:  Unable to obtain vitals for this visit due to virtual visit.    Gen: Alert and oriented, No apparent distress.  Chest and lungs: Normal chest rise and fall.  No increased work of breathing.    Assessment & Plan:     79 y.o. female with the following -     Problem List Items Addressed This Visit       JIMMY (generalized anxiety disorder) (Chronic)     Reiterated risks of increased falls and possible hip fractures or intracranial bleeds as a result of falls with this medication.  Also discussed that risk of falls is even greater due to the other sedating medications that patient is on. Patient understands risks but feels that her quality of life would be significantly decreased with a reduction of this medication.  Will provide patient with another month supply refill.  Discussed with patient that she will need an in person visit in one month for another 3 month supply.         Relevant Medications    clonazePAM (KLONOPIN) 1 MG Tab (Start on 12/21/2024)         Return in about 4 weeks (around 1/7/2025).

## 2024-12-11 NOTE — ASSESSMENT & PLAN NOTE
Reiterated risks of increased falls and possible hip fractures or intracranial bleeds as a result of falls with this medication.  Also discussed that risk of falls is even greater due to the other sedating medications that patient is on. Patient understands risks but feels that her quality of life would be significantly decreased with a reduction of this medication.  Will provide patient with another month supply refill.  Discussed with patient that she will need an in person visit in one month for another 3 month supply.

## 2024-12-13 DIAGNOSIS — M19.90 ARTHRITIS: ICD-10-CM

## 2024-12-18 DIAGNOSIS — M19.90 ARTHRITIS: ICD-10-CM

## 2024-12-31 RX ORDER — TRAZODONE HYDROCHLORIDE 100 MG/1
100 TABLET ORAL NIGHTLY
Qty: 90 TABLET | Refills: 3 | Status: SHIPPED | OUTPATIENT
Start: 2024-12-31

## 2025-01-14 ENCOUNTER — APPOINTMENT (OUTPATIENT)
Dept: MEDICAL GROUP | Facility: CLINIC | Age: 80
End: 2025-01-14
Payer: MEDICARE

## 2025-01-14 ENCOUNTER — HOSPITAL ENCOUNTER (OUTPATIENT)
Dept: LAB | Facility: MEDICAL CENTER | Age: 80
End: 2025-01-14
Payer: MEDICARE

## 2025-01-14 DIAGNOSIS — M19.90 ARTHRITIS: ICD-10-CM

## 2025-01-14 DIAGNOSIS — R73.03 PREDIABETES: ICD-10-CM

## 2025-01-14 LAB
CRP SERPL HS-MCNC: <0.3 MG/DL (ref 0–0.75)
ERYTHROCYTE [SEDIMENTATION RATE] IN BLOOD BY WESTERGREN METHOD: 6 MM/HOUR (ref 0–25)
RHEUMATOID FACT SER IA-ACNC: <10 IU/ML (ref 0–14)

## 2025-01-14 PROCEDURE — 86140 C-REACTIVE PROTEIN: CPT

## 2025-01-14 PROCEDURE — 86200 CCP ANTIBODY: CPT

## 2025-01-14 PROCEDURE — 36415 COLL VENOUS BLD VENIPUNCTURE: CPT

## 2025-01-14 PROCEDURE — 85652 RBC SED RATE AUTOMATED: CPT

## 2025-01-14 PROCEDURE — 86431 RHEUMATOID FACTOR QUANT: CPT

## 2025-01-16 LAB — CCP IGA+IGG SERPL IA-ACNC: 16 UNITS (ref 0–19)

## 2025-01-22 ENCOUNTER — HOSPITAL ENCOUNTER (OUTPATIENT)
Dept: RADIOLOGY | Facility: MEDICAL CENTER | Age: 80
End: 2025-01-22
Payer: MEDICARE

## 2025-01-22 DIAGNOSIS — I10 PRIMARY HYPERTENSION: ICD-10-CM

## 2025-01-22 PROCEDURE — 93975 VASCULAR STUDY: CPT

## 2025-01-26 DIAGNOSIS — G62.9 NEUROPATHY: ICD-10-CM

## 2025-01-28 ENCOUNTER — TELEMEDICINE (OUTPATIENT)
Dept: MEDICAL GROUP | Facility: CLINIC | Age: 80
End: 2025-01-28
Payer: MEDICARE

## 2025-01-28 DIAGNOSIS — M79.604 BILATERAL LOWER EXTREMITY PAIN: ICD-10-CM

## 2025-01-28 DIAGNOSIS — F41.9 ANXIETY: ICD-10-CM

## 2025-01-28 DIAGNOSIS — F41.1 GENERALIZED ANXIETY DISORDER: ICD-10-CM

## 2025-01-28 DIAGNOSIS — M79.605 BILATERAL LOWER EXTREMITY PAIN: ICD-10-CM

## 2025-01-28 DIAGNOSIS — M81.0 AGE-RELATED OSTEOPOROSIS WITHOUT CURRENT PATHOLOGICAL FRACTURE: ICD-10-CM

## 2025-01-28 PROCEDURE — 99213 OFFICE O/P EST LOW 20 MIN: CPT | Mod: GE,95

## 2025-01-28 RX ORDER — CLONAZEPAM 1 MG/1
1 TABLET ORAL DAILY
Qty: 30 TABLET | Refills: 0 | Status: SHIPPED | OUTPATIENT
Start: 2025-02-27 | End: 2025-03-29

## 2025-01-28 RX ORDER — CLONAZEPAM 1 MG/1
1 TABLET ORAL DAILY
Qty: 30 TABLET | Refills: 0 | Status: SHIPPED | OUTPATIENT
Start: 2025-03-29 | End: 2025-04-28

## 2025-01-28 RX ORDER — CLONAZEPAM 1 MG/1
1 TABLET ORAL DAILY
Qty: 30 TABLET | Refills: 0 | Status: SHIPPED | OUTPATIENT
Start: 2025-01-28 | End: 2025-02-27

## 2025-01-28 NOTE — PROGRESS NOTES
This evaluation was conducted via Teams using secure and encrypted videoconferencing technology. The patient was in their home in the [facility].    The patient's identity was confirmed and verbal consent was obtained for this virtual visit.     Subjective:     CC: Medication refill    HPI:   Jessica with chronic pain, restless leg syndrome, generalized anxiety disorder who presents today with:    Problem   Bilateral Lower Extremity Pain    Patient has been experiencing an aching pain in her bilateral lower extremities.  The area of pain will change from her knees to her thighs and then to her hips.  It does not stay consistent.  She denies any worsening pain when ambulating.  Patient reports worse pain when it is cold outside.  She states that this pain is different from her chronic pain and has been so for about 6 months now.     Anxiety    Chronic and well-controlled on Klonopin.         Current Outpatient Medications Ordered in Epic   Medication Sig Dispense Refill    clonazePAM (KLONOPIN) 1 MG Tab Take 1 Tablet by mouth every day for 30 days. 30 Tablet 0    [START ON 2/27/2025] clonazePAM (KLONOPIN) 1 MG Tab Take 1 Tablet by mouth every day for 30 days. 30 Tablet 0    [START ON 3/29/2025] clonazePAM (KLONOPIN) 1 MG Tab Take 1 Tablet by mouth every day for 30 days. 30 Tablet 0    traZODone (DESYREL) 100 MG Tab Take 1 Tablet by mouth every evening. 90 Tablet 3    ibuprofen (MOTRIN) 800 MG Tab TAKE 1 TABLET BY MOUTH EVERY 8 HOURS AS NEEDED FOR MILD PAIN OR PAIN 50 Tablet 0    lisinopril (PRINIVIL) 20 MG Tab Take 20 mg by mouth 2 times a day.      propranolol (INDERAL) 40 MG Tab TAKE 1 TABLET BY MOUTH TWICE DAILY 180 Tablet 3    diclofenac sodium (VOLTAREN) 1 % Gel APPLY 2 GRAMS TOPICALLY TO THE AFFECTED AREA FOUR TIMES DAILY AS NEEDED 400 g 3    tizanidine (ZANAFLEX) 4 MG Tab Take 1 Tablet by mouth every 6 hours as needed (For back pain). for spasm 270 Tablet 3    ROPINIRole (REQUIP) 1 MG Tab Take 1 Tablet  by mouth 4 times a day for 360 days. 120 Tablet 11    amLODIPine (NORVASC) 5 MG Tab Take 1 Tablet by mouth every day. 90 Tablet 3    metronidazole (METROGEL) 0.75 % gel APPLY TOPICALLY TO THE AFFECTED AREA TWICE DAILY 45 g 1    Non Formulary Request Take 1 Tab by mouth 3 times a day. **BETA TCD** for gallbladder      Non Formulary Request Take 30 Drops by mouth 3 times a day. **SUPER PHOSPHATE LIQ**      Non Formulary Request Take 1 Tab by mouth every bedtime. **MAGNESIUM AND POTASSIUM ASPARTATE**       No current Epic-ordered facility-administered medications on file.       ROS:  ROS as per HPI. Otherwise negative.      Objective:     Exam:  There were no vitals taken for this visit. There is no height or weight on file to calculate BMI.    Gen: Alert and oriented, No apparent distress.  Cardiovascular and respiratory: Normal chest rise and fall.    Assessment & Plan:     79 y.o. female with the following -     Problem List Items Addressed This Visit       JIMMY (generalized anxiety disorder) (Chronic)    Relevant Medications    clonazePAM (KLONOPIN) 1 MG Tab    clonazePAM (KLONOPIN) 1 MG Tab (Start on 2/27/2025)    clonazePAM (KLONOPIN) 1 MG Tab (Start on 3/29/2025)    Anxiety     Patient was again counseled on risks of confusion, drowsiness, and falls which may lead to fractures or head injuries while on Klonopin with her ropinirole, tizanidine, and trazodone.  Patient understands risks and would like to continue on current regimen right risks.  Discussed with patient that we will need to go down on one of these medications during follow-up appointment.         Bilateral lower extremity pain     Unfortunately, I was unable to examine patient today as this was a virtual visit.  Patient would like to proceed with a duplex ultrasound of her lower extremities to rule out PAD.  Patient will need to follow-up in person for an exam.          Other Visit Diagnoses       Age-related osteoporosis without current pathological  fracture        Relevant Orders    DS-BONE DENSITY STUDY (DEXA)              Return in about 3 months (around 4/28/2025).

## 2025-01-28 NOTE — ASSESSMENT & PLAN NOTE
Patient was again counseled on risks of confusion, drowsiness, and falls which may lead to fractures or head injuries while on Klonopin with her ropinirole, tizanidine, and trazodone.  Patient understands risks and would like to continue on current regimen right risks.  Discussed with patient that we will need to go down on one of these medications during follow-up appointment.  
Unfortunately, I was unable to examine patient today as this was a virtual visit.  Patient would like to proceed with a duplex ultrasound of her lower extremities to rule out PAD.  Patient will need to follow-up in person for an exam.  
normal...

## 2025-02-10 ENCOUNTER — PATIENT MESSAGE (OUTPATIENT)
Dept: MEDICAL GROUP | Facility: CLINIC | Age: 80
End: 2025-02-10
Payer: MEDICARE

## 2025-02-19 ENCOUNTER — HOSPITAL ENCOUNTER (OUTPATIENT)
Dept: RADIOLOGY | Facility: MEDICAL CENTER | Age: 80
End: 2025-02-19
Payer: MEDICARE

## 2025-02-19 DIAGNOSIS — G62.9 NEUROPATHY: ICD-10-CM

## 2025-02-19 PROCEDURE — 93922 UPR/L XTREMITY ART 2 LEVELS: CPT

## 2025-02-19 NOTE — TELEPHONE ENCOUNTER
Received request via: Pharmacy    Was the patient seen in the last year in this department? Yes    Does the patient have an active prescription (recently filled or refills available) for medication(s) requested? No    Pharmacy Name: [facility] #[id number] - [place], [place] - [address] AT [facility] [address] [address]     Does the patient have residential Plus and need 100-day supply? (This applies to ALL medications) Yes, quantity updated to 100 days

## 2025-02-25 ENCOUNTER — RESULTS FOLLOW-UP (OUTPATIENT)
Dept: MEDICAL GROUP | Facility: CLINIC | Age: 80
End: 2025-02-25

## 2025-02-28 ENCOUNTER — TELEMEDICINE (OUTPATIENT)
Dept: MEDICAL GROUP | Facility: CLINIC | Age: 80
End: 2025-02-28
Payer: MEDICARE

## 2025-02-28 DIAGNOSIS — M79.652 PAIN OF LEFT THIGH: ICD-10-CM

## 2025-02-28 DIAGNOSIS — Z79.899 POLYPHARMACY: ICD-10-CM

## 2025-02-28 DIAGNOSIS — I10 PRIMARY HYPERTENSION: ICD-10-CM

## 2025-02-28 NOTE — ASSESSMENT & PLAN NOTE
Patient's goal blood pressure for age is <150/90.  Given that she is on several sedating medications, do not recommend strict blood pressure control at this time.  Discussed that patient should continue checking her blood pressures and we should consider addition of another medication if her blood pressures are consistently greater than 150/90.

## 2025-02-28 NOTE — ASSESSMENT & PLAN NOTE
Reviewed SHERIDAN which was largely unremarkable.  There is dampened wave flow of the first digit on the right indicating possible early peripheral arterial disease.  This is likely not causing patient's left thigh pain.  Reviewed previous x-ray results which showed mild DJD of the left hip.  This may be radiating to patient's thigh and could be because of pain.  Recommend a repeat hip x-ray as those images were done in 2011.  Patient will follow-up after imaging is completed.

## 2025-02-28 NOTE — PROGRESS NOTES
This evaluation was conducted via Teams using secure and encrypted videoconferencing technology. The patient was in their home in the [facility].    The patient's identity was confirmed and verbal consent was obtained for this virtual visit.     Subjective:     CC: SHERIDAN follow-up, left thigh pain    HPI:   Jessica with JIMMY, HTN, chronic pain who presents today with:    Problem   Pain of Left Thigh    Patient reports experiencing left thigh pain for several months now that is worsening. She describes it as an aching pain in her mid thigh on the medical aspect. The patient does have a history of sciatica but states that this pain is different. She does have a history of hip injury following a fall.      Polypharmacy    Discussed with patient that she is at high risk for polypharmacy given her current medication regimen.  This could lead to increased falls and pathological fractures. Patient's pain also does not seem to be improving despite her current regimen and she would likely benefit from a change in medications.     Hypertension    Patient states that she stopped taking her amlodipine as she was having side effects of lower extremity swelling on the medication.  This has since improved since stopping it.  She has continued to take her lisinopril twice daily.  Patient has been checking her blood pressures at home and she states that they have been in the 140s/80s.  They have not gone higher than 150/90.         Current Outpatient Medications Ordered in Epic   Medication Sig Dispense Refill    clonazePAM (KLONOPIN) 1 MG Tab Take 1 Tablet by mouth every day for 30 days. 30 Tablet 0    [START ON 3/29/2025] clonazePAM (KLONOPIN) 1 MG Tab Take 1 Tablet by mouth every day for 30 days. 30 Tablet 0    traZODone (DESYREL) 100 MG Tab Take 1 Tablet by mouth every evening. 90 Tablet 3    ibuprofen (MOTRIN) 800 MG Tab TAKE 1 TABLET BY MOUTH EVERY 8 HOURS AS NEEDED FOR MILD PAIN OR PAIN 50 Tablet 0    lisinopril (PRINIVIL)  20 MG Tab Take 20 mg by mouth 2 times a day.      propranolol (INDERAL) 40 MG Tab TAKE 1 TABLET BY MOUTH TWICE DAILY 180 Tablet 3    diclofenac sodium (VOLTAREN) 1 % Gel APPLY 2 GRAMS TOPICALLY TO THE AFFECTED AREA FOUR TIMES DAILY AS NEEDED 400 g 3    tizanidine (ZANAFLEX) 4 MG Tab Take 1 Tablet by mouth every 6 hours as needed (For back pain). for spasm 270 Tablet 3    ROPINIRole (REQUIP) 1 MG Tab Take 1 Tablet by mouth 4 times a day for 360 days. 120 Tablet 11    metronidazole (METROGEL) 0.75 % gel APPLY TOPICALLY TO THE AFFECTED AREA TWICE DAILY 45 g 1    Non Formulary Request Take 1 Tab by mouth 3 times a day. **BETA TCD** for gallbladder      Non Formulary Request Take 30 Drops by mouth 3 times a day. **SUPER PHOSPHATE LIQ**      Non Formulary Request Take 1 Tab by mouth every bedtime. **MAGNESIUM AND POTASSIUM ASPARTATE**       No current Epic-ordered facility-administered medications on file.       ROS:  ROS as per HPI. Otherwise negative.      Objective:     Exam:  There were no vitals taken for this visit. There is no height or weight on file to calculate BMI.    Gen: Well appearing. Normal chest rise and fall.    Assessment & Plan:     79 y.o. female with the following -     Problem List Items Addressed This Visit       Hypertension    Patient's goal blood pressure for age is <150/90.  Given that she is on several sedating medications, do not recommend strict blood pressure control at this time.  Discussed that patient should continue checking her blood pressures and we should consider addition of another medication if her blood pressures are consistently greater than 150/90.         Pain of left thigh    Reviewed SHERIDAN which was largely unremarkable.  There is dampened wave flow of the first digit on the right indicating possible early peripheral arterial disease.  This is likely not causing patient's left thigh pain.  Reviewed previous x-ray results which showed mild DJD of the left hip.  This may be  radiating to patient's thigh and could be because of pain.  Recommend a repeat hip x-ray as those images were done in 2011.  Patient will follow-up after imaging is completed.         Relevant Orders    DX-HIP-UNILATERAL-W/O PELVIS-2/3 VIEWS LEFT    Polypharmacy    We discussed importance of decreasing one of her current medications which increase her risk of falls.  Patient feels like her ropinirole should remain at 4 times daily.  We discussed possibly going down on her tizanidine or trazodone.  Patient will think about which medications have been less effective and can be decreased next month.  We will consider the addition of another medication for pain such as an SNRI.              Return in about 4 weeks (around 3/28/2025).    *Discussed with patient that she should follow-up in person however, her  is currently undergoing treatment for prostate cancer and this has been very difficult to do.

## 2025-02-28 NOTE — ASSESSMENT & PLAN NOTE
We discussed importance of decreasing one of her current medications which increase her risk of falls.  Patient feels like her ropinirole should remain at 4 times daily.  We discussed possibly going down on her tizanidine or trazodone.  Patient will think about which medications have been less effective and can be decreased next month.  We will consider the addition of another medication for pain such as an SNRI.

## 2025-03-12 ENCOUNTER — TELEMEDICINE (OUTPATIENT)
Dept: MEDICAL GROUP | Facility: CLINIC | Age: 80
End: 2025-03-12
Payer: MEDICARE

## 2025-03-12 DIAGNOSIS — F41.1 GENERALIZED ANXIETY DISORDER: ICD-10-CM

## 2025-03-12 DIAGNOSIS — M54.16 LUMBAR RADICULOPATHY: ICD-10-CM

## 2025-03-12 DIAGNOSIS — M79.652 PAIN OF LEFT THIGH: ICD-10-CM

## 2025-03-12 DIAGNOSIS — M47.817 LUMBOSACRAL SPONDYLOSIS WITHOUT MYELOPATHY: ICD-10-CM

## 2025-03-12 PROBLEM — Z12.31 SCREENING MAMMOGRAM FOR BREAST CANCER: Status: RESOLVED | Noted: 2020-05-15 | Resolved: 2025-03-12

## 2025-03-12 PROCEDURE — 99212 OFFICE O/P EST SF 10 MIN: CPT | Performed by: FAMILY MEDICINE

## 2025-03-12 RX ORDER — CLONAZEPAM 1 MG/1
1 TABLET ORAL DAILY
Qty: 90 TABLET | Refills: 0 | Status: SHIPPED | OUTPATIENT
Start: 2025-03-29 | End: 2025-06-27

## 2025-03-12 NOTE — PROGRESS NOTES
This telemedicine encounter was conducted via ZOOM.  Verbal consent was obtained.  Patient was in a private location in the state of [place].  Patient's identity was verified.   Subjective:   CC:  f/u    HPI:   - recently dx w/ met prostate ca  -son w/ leukemia  -she's having difficulty with walking, pain to L thigh, compensating on right, not her sciatica.  Has MRI pending  Problem   Screening Mammogram for Breast Cancer (Resolved)       Current Outpatient Medications Ordered in Epic   Medication Sig Dispense Refill    [START ON 3/29/2025] clonazePAM (KLONOPIN) 1 MG Tab Take 1 Tablet by mouth every day for 90 days. 90 Tablet 0    tizanidine (ZANAFLEX) 4 MG Tab TAKE 1 TABLET BY MOUTH EVERY 6 HOURS AS NEEDED FOR SPASM 120 Tablet 0    clonazePAM (KLONOPIN) 1 MG Tab Take 1 Tablet by mouth every day for 30 days. 30 Tablet 0    traZODone (DESYREL) 100 MG Tab Take 1 Tablet by mouth every evening. 90 Tablet 3    ibuprofen (MOTRIN) 800 MG Tab TAKE 1 TABLET BY MOUTH EVERY 8 HOURS AS NEEDED FOR MILD PAIN OR PAIN 50 Tablet 0    lisinopril (PRINIVIL) 20 MG Tab Take 20 mg by mouth 2 times a day.      propranolol (INDERAL) 40 MG Tab TAKE 1 TABLET BY MOUTH TWICE DAILY 180 Tablet 3    diclofenac sodium (VOLTAREN) 1 % Gel APPLY 2 GRAMS TOPICALLY TO THE AFFECTED AREA FOUR TIMES DAILY AS NEEDED 400 g 3    ROPINIRole (REQUIP) 1 MG Tab Take 1 Tablet by mouth 4 times a day for 360 days. 120 Tablet 11    metronidazole (METROGEL) 0.75 % gel APPLY TOPICALLY TO THE AFFECTED AREA TWICE DAILY 45 g 1    Non Formulary Request Take 1 Tab by mouth 3 times a day. **BETA TCD** for gallbladder      Non Formulary Request Take 30 Drops by mouth 3 times a day. **SUPER PHOSPHATE LIQ**      Non Formulary Request Take 1 Tab by mouth every bedtime. **MAGNESIUM AND POTASSIUM ASPARTATE**       No current Epic-ordered facility-administered medications on file.         ROS:  Gen: no fevers/chills  Pulm: no sob, no cough  CV: no chest pain, no  palpitations  GI: no nausea/vomiting, no diarrhea        Objective:     Exam:  There were no vitals taken for this visit. There is no height or weight on file to calculate BMI.    Gen: Alert and oriented, No apparent distress.  Neck: Neck is supple  Lungs: Normal effort        Assessment & Plan:     79 y.o. female with the following -     Problem List Items Addressed This Visit       JIMMY (generalized anxiety disorder) (Chronic)    Relevant Medications    clonazePAM (KLONOPIN) 1 MG Tab (Start on 3/29/2025)    Lumbosacral spondylosis without myelopathy    Relevant Orders    Referral to Spine Surgery    Pain of left thigh     Other Visit Diagnoses         Lumbar radiculopathy        Relevant Medications    clonazePAM (KLONOPIN) 1 MG Tab (Start on 3/29/2025)    Other Relevant Orders    Referral to Spine Surgery                  No follow-ups on file.

## 2025-03-28 ENCOUNTER — HOSPITAL ENCOUNTER (OUTPATIENT)
Dept: RADIOLOGY | Facility: MEDICAL CENTER | Age: 80
End: 2025-03-28
Payer: MEDICARE

## 2025-03-28 DIAGNOSIS — M81.0 AGE-RELATED OSTEOPOROSIS WITHOUT CURRENT PATHOLOGICAL FRACTURE: ICD-10-CM

## 2025-03-28 PROCEDURE — 77080 DXA BONE DENSITY AXIAL: CPT

## 2025-03-31 ENCOUNTER — RESULTS FOLLOW-UP (OUTPATIENT)
Dept: HOSPITALIST | Facility: MEDICAL CENTER | Age: 80
End: 2025-03-31

## 2025-03-31 NOTE — TELEPHONE ENCOUNTER
Received request via: Pharmacy    Was the patient seen in the last year in this department? Yes    Does the patient have an active prescription (recently filled or refills available) for medication(s) requested? No    Pharmacy Name:   [id number] - [place], [place] - [address] AT [facility] [address] [id number]  Phone: [phone] Fax: [phone]        Does the patient have senior living Plus and need 100-day supply? (This applies to ALL medications) Yes, quantity updated to 100 days  
73yo Male with PMHx of CAD s/p PCI x2 most recent to Cx in 2019, HTN, T2DM, Covid-19 two weeks ago, who presented for chest pain, palpitations, shortness of breath. HRs 170s, monomorphic VT on Telemetry, lightheaded, felt like he was going to pass out. Shocked twice with brief return to sinus rhythm. Given Lidocaine bolus and Amio bolus and started on Lidocaine and Amio gtts. Taken to cath lab for LHC and IABP. S/p PCI to RCA and RPL. One of his EKG's in the ED was concerning for Afib with aberrancy. No known prior hx of Afib.     TTE:  1. The left ventricular systolic function is severely decreased with an ejection fraction of 25 % by 3D.   2. Multiple segmental abnormalities exist. See findings.   3. Normal right ventricular cavity size and normal systolic function.   4. Structurally normal tricuspid valve with normal leaflet excursion. Moderate tricuspid regurgitation.   5. Estimated pulmonary artery systolic pressure is 51 mmHg.   6. No pericardial effusion seen.   7. No prior echocardiogram is available for comparison.
75yo Male with PMHx of CAD s/p PCI x2 most recent to Cx in 2019, HTN, T2DM, Covid-19 two weeks ago, who presented for chest pain, palpitations, shortness of breath. Reports on and off chest pain for past 2 weeks and palpitations. On 4/8 woke up feeling lightheaded, short of breath, chest pain. On arrival to ED, HRs 170s, monomorphic VT on Telemetry, lightheaded, felt like he was going to pass out. Shocked twice with brief return to sinus rhythm. Given Lidocaine bolus and Amio bolus and started on Lidocaine and Amio gtts. Taken to cath lab for LHC and IABP. S/p PCI to RCA and RPL. One of his EKG's in the ED was concerning for Afib with aberrancy. No known prior hx of Afib. TTE 4/8 with LVEF 25%, normal RV. Hospital course complicated by refractory VT requiring intubation and sedation 4/10. Started on Quinidine on 4/10. Planned for VT ablation 4/14 but developed melena. S/p flex sig which showed ulcerated gastric mucosa neena NGT tip NGT replaced. Hgb stable. Extubated 4/15. Has remained electrically quiet.     Had fevers, last on 4/9 PM. Blood culture from 4/9 with GPCs (felt to be contaminant) and bronch culture from 4/12 with staph aureus (MSSA), normal oral bao, ID following. S/p broad spectrum abx completed 4/17.     Course complicated by acute CVA 4/17 in left frontal lobe with minimal hemorrhage posteriorly, Neurology was following.

## 2025-04-09 ENCOUNTER — APPOINTMENT (OUTPATIENT)
Dept: MEDICAL GROUP | Facility: CLINIC | Age: 80
End: 2025-04-09
Payer: MEDICARE

## 2025-04-11 ENCOUNTER — APPOINTMENT (OUTPATIENT)
Dept: DERMATOLOGY | Facility: IMAGING CENTER | Age: 80
End: 2025-04-11
Payer: MEDICARE

## 2025-04-25 ENCOUNTER — APPOINTMENT (OUTPATIENT)
Dept: DERMATOLOGY | Facility: IMAGING CENTER | Age: 80
End: 2025-04-25
Payer: MEDICARE

## 2025-04-29 ENCOUNTER — TELEPHONE (OUTPATIENT)
Dept: HEALTH INFORMATION MANAGEMENT | Facility: OTHER | Age: 80
End: 2025-04-29

## 2025-05-01 ENCOUNTER — APPOINTMENT (OUTPATIENT)
Dept: MEDICAL GROUP | Facility: CLINIC | Age: 80
End: 2025-05-01
Payer: MEDICARE

## 2025-05-29 NOTE — PROGRESS NOTES
Telemedicine Video Visit: Established Patient   This Remote Face to Face encounter was conducted via Zoom. Given the importance of social distancing and other strategies recommended to reduce the risk of COVID-19 transmission, I am providing medical care to this patient via audio/video visit in place of an in person visit at the request of the patient. Verbal consent to telehealth, risks, benefits, and consequences were discussed. Patient retains the right to withdraw at any time. All existing confidentiality protections apply. The patient has access to all transmitted medical information. No dissemination of any patient images or information to other entities without further written consent.  Subjective:     Chief Complaint   Patient presents with    Follow-Up     meds       Jessica Marion Abad is a 79 y.o. female presenting for evaluation and management of:    F/u LLE radiculopathy 2/2 hip arthritis s/p injection which helped with pain and increased ambulation - but - thinks neg side effects related - bilat favian horses & HA.  Doesn't want another injection - starting PT - but wants more mobility and support to  undergoing radiation.    ROS   Denies any recent fevers or chills. No nausea or vomiting. No chest pains or shortness of breath.     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 01/28/2019    Rosacea 01/28/2019    Actinic keratoses 01/28/2019    Lumbosacral  spondylosis without myelopathy 03/13/2015    Hypertension 02/04/2015    Back pain 08/07/2014    Anemia 12/07/2011    JIMMY (generalized anxiety disorder) 12/07/2011    Cerebral infarction (HCC)     Dysplastic cerebellar gangliocytoma (HCC)        Family History   Problem Relation Age of Onset    Heart Disease Father         MI AT 42 Y/0    Hypertension Father     Stroke Mother     Breast Cancer Mother     Diabetes Maternal Grandfather        She  has a past medical history of Actinic keratitis (1/28/2019), Actinic keratoses (1/28/2019), Anxiety, Breath shortness, CVA (cerebral infarction) (2004), DDD (degenerative disc disease), lumbosacral, Degenerative joint disease, DJD (degenerative joint disease), Dysplastic cerebellar gangliocytoma (HCC), Fibromyalgia, Insomnia, 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  She  has a past surgical history that includes other orthopedic surgery; pr inj dx/ther agnt paravert facet joint, jaja* (11/14/2011); pr inj dx/ther agnt paravert facet joint, ce* (11/14/2011); abdominal hysterectomy total; neuro dest facet l/s w/ig sngl (8/7/2014); neuro dest facet l/s w/ig addl (8/7/2014); neuro dest facet l/s w/ig addl (8/7/2014); neuro dest facet l/s w/ig addl (8/7/2014); neuro dest facet l/s w/ig sngl (3/13/2015); and knee replacement, total (Right).       Objective:   Vitals obtained by patient:  There were no vitals taken for this visit.    Physical Exam:  Constitutional: Alert, no distress, well-groomed.  Skin: No rashes in visible areas.  Eye: Round. Conjunctiva clear, lids normal. No icterus.   ENMT: Lips pink without lesions, good dentition, moist mucous membranes. Phonation normal.  Neck: No masses, no thyromegaly. Moves freely without pain.  CV: Pulse as reported by patient  Respiratory: Unlabored respiratory effort, no  cough or audible wheeze  Psych: Alert and oriented x3, normal affect and mood.       Assessment and Plan:   The following treatment plan was discussed:     1. Generalized anxiety disorder  - clonazePAM (KLONOPIN) 1 MG Tab; Take 1 Tablet by mouth every day for 90 days.  Dispense: 90 Tablet; Refill: 0    2. Tremors of nervous system  - propranolol (INDERAL) 40 MG Tab; Take 1 Tablet by mouth 2 times a day.  Dispense: 180 Tablet; Refill: 3    3. Arthritis of left hip    4. Restless leg syndrome    5. Neuropathy    6. Primary hypertension    Other orders  - ibuprofen (MOTRIN) 800 MG Tab; Take 1 Tablet by mouth every 8 hours as needed for Moderate Pain.  Dispense: 50 Tablet; Refill: 0  - gabapentin (NEURONTIN) 800 MG tablet; Take 1.5 Tablets by mouth 3 times a day.  Dispense: 405 Tablet; Refill: 3  - lisinopril (PRINIVIL) 20 MG Tab; Take 1 Tablet by mouth 2 times a day.  Dispense: 200 Tablet; Refill: 3  - ROPINIRole (REQUIP) 1 MG Tab; Take 1 Tablet by mouth 4 times a day for 360 days.  Dispense: 360 Tablet; Refill: 3  - tizanidine (ZANAFLEX) 4 MG Tab; Take 1 Tablet by mouth 3 times a day. for spasm  Dispense: 270 Tablet; Refill: 3        Follow-up: No follow-ups on file.    Face to Face Video Visit:   I spent 20 minutes with patien and I conducted this visit with audio and video present.  Amy Crabtree M.D.               [1]   Allergies  Allergen Reactions    Nsaids Hives, Rash and Itching    Asa [Aspirin]      BAD ACID REFLUX    Codeine     Morphine     Sulfa Drugs    [2]   Current Outpatient Medications   Medication Sig Dispense Refill    clonazePAM (KLONOPIN) 1 MG Tab Take 1 Tablet by mouth every day for 90 days. 90 Tablet 0    ibuprofen (MOTRIN) 800 MG Tab Take 1 Tablet by mouth every 8 hours as needed for Moderate Pain. 50 Tablet 0    gabapentin (NEURONTIN) 800 MG tablet Take 1.5 Tablets by mouth 3 times a day. 405 Tablet 3    lisinopril (PRINIVIL) 20 MG Tab Take 1 Tablet by mouth 2 times a day. 200 Tablet 3     propranolol (INDERAL) 40 MG Tab Take 1 Tablet by mouth 2 times a day. 180 Tablet 3    ROPINIRole (REQUIP) 1 MG Tab Take 1 Tablet by mouth 4 times a day for 360 days. 360 Tablet 3    tizanidine (ZANAFLEX) 4 MG Tab Take 1 Tablet by mouth 3 times a day. for spasm 270 Tablet 3    ascorbic acid (VITAMIN C) 500 MG tablet ASCORBIC ACID 500 MG TABS      Cholecalciferol (PA VITAMIN D-3) 2000 UNIT Cap PA VITAMIN D-3 2000 UNIT ORAL CAPSULE      Coenzyme Q10 100 MG Cap COENZYME Q10 100 MG CAPS      diphenhydrAMINE (BENADRYL) 50 MG Cap DIPHENHYDRAMINE HCL 50 MG CAPS      traZODone (DESYREL) 100 MG Tab Take 1 Tablet by mouth every evening. 90 Tablet 3    metronidazole (METROGEL) 0.75 % gel APPLY TOPICALLY TO THE AFFECTED AREA TWICE DAILY 45 g 1    Non Formulary Request Take 1 Tab by mouth 3 times a day. **BETA TCD** for gallbladder      Non Formulary Request Take 30 Drops by mouth 3 times a day. **SUPER PHOSPHATE LIQ**      Non Formulary Request Take 1 Tab by mouth every bedtime. **MAGNESIUM AND POTASSIUM ASPARTATE**       No current facility-administered medications for this visit.

## 2025-05-30 ENCOUNTER — APPOINTMENT (OUTPATIENT)
Dept: MEDICAL GROUP | Facility: CLINIC | Age: 80
End: 2025-05-30
Payer: MEDICARE

## 2025-05-30 DIAGNOSIS — R25.1 TREMORS OF NERVOUS SYSTEM: ICD-10-CM

## 2025-05-30 DIAGNOSIS — M16.12 ARTHRITIS OF LEFT HIP: Primary | ICD-10-CM

## 2025-05-30 DIAGNOSIS — F41.1 GENERALIZED ANXIETY DISORDER: ICD-10-CM

## 2025-05-30 DIAGNOSIS — G25.81 RESTLESS LEG SYNDROME: ICD-10-CM

## 2025-05-30 DIAGNOSIS — I10 PRIMARY HYPERTENSION: ICD-10-CM

## 2025-05-30 DIAGNOSIS — G62.9 NEUROPATHY: ICD-10-CM

## 2025-05-30 RX ORDER — ROPINIROLE 1 MG/1
1 TABLET, FILM COATED ORAL 4 TIMES DAILY
Qty: 360 TABLET | Refills: 3 | Status: SHIPPED | OUTPATIENT
Start: 2025-05-30 | End: 2026-05-25

## 2025-05-30 RX ORDER — LISINOPRIL 20 MG/1
20 TABLET ORAL 2 TIMES DAILY
Qty: 200 TABLET | Refills: 3 | Status: SHIPPED | OUTPATIENT
Start: 2025-05-30

## 2025-05-30 RX ORDER — IBUPROFEN 800 MG/1
800 TABLET, FILM COATED ORAL EVERY 8 HOURS PRN
Qty: 50 TABLET | Refills: 0 | Status: SHIPPED | OUTPATIENT
Start: 2025-05-30

## 2025-05-30 RX ORDER — CLONAZEPAM 1 MG/1
1 TABLET ORAL DAILY
Qty: 90 TABLET | Refills: 0 | Status: SHIPPED | OUTPATIENT
Start: 2025-05-30 | End: 2025-08-28

## 2025-05-30 RX ORDER — PROPRANOLOL HYDROCHLORIDE 40 MG/1
40 TABLET ORAL 2 TIMES DAILY
Qty: 180 TABLET | Refills: 3 | Status: SHIPPED | OUTPATIENT
Start: 2025-05-30

## 2025-05-30 RX ORDER — GABAPENTIN 800 MG/1
1200 TABLET ORAL 3 TIMES DAILY
Qty: 405 TABLET | Refills: 3 | Status: SHIPPED | OUTPATIENT
Start: 2025-05-30

## 2025-05-30 ASSESSMENT — PATIENT HEALTH QUESTIONNAIRE - PHQ9: CLINICAL INTERPRETATION OF PHQ2 SCORE: 0

## 2025-05-31 PROBLEM — M16.12 ARTHRITIS OF LEFT HIP: Status: ACTIVE | Noted: 2025-05-31

## 2025-06-01 NOTE — ASSESSMENT & PLAN NOTE
Patient is requesting a refill of her Ropinerole. She is taking 2 tablets in the morning and 2 tablets at night. Patient reports improvement in her symptoms with her current regimen and has tried lower doses in the past but is unable to sit during the day or sleep at night with lower doses.

## 2025-06-01 NOTE — ASSESSMENT & PLAN NOTE
Patient is requesting a refill of her Klonopin. She reports no side effects and no recent falls. Patient has tried to wean off of medications in the past but has not tolerated lower doses.

## 2025-06-01 NOTE — ASSESSMENT & PLAN NOTE
We discussed another hip injection - I think it will help her meet her mobility goals to help her  during his cancer treatments.

## 2025-06-12 ENCOUNTER — OFFICE VISIT (OUTPATIENT)
Dept: DERMATOLOGY | Facility: IMAGING CENTER | Age: 80
End: 2025-06-12
Payer: MEDICARE

## 2025-06-12 DIAGNOSIS — D49.2 NEOPLASM OF SKIN: Primary | ICD-10-CM

## 2025-06-12 DIAGNOSIS — L71.9 ROSACEA: ICD-10-CM

## 2025-06-12 DIAGNOSIS — L29.9 ITCHING: ICD-10-CM

## 2025-06-12 PROCEDURE — 99203 OFFICE O/P NEW LOW 30 MIN: CPT | Mod: 25 | Performed by: DERMATOLOGY

## 2025-06-12 PROCEDURE — 11102 TANGNTL BX SKIN SINGLE LES: CPT | Performed by: DERMATOLOGY

## 2025-06-12 RX ORDER — METRONIDAZOLE TOPICAL 7.5 MG/G
1 GEL TOPICAL 2 TIMES DAILY
Qty: 45 G | Refills: 1 | Status: SHIPPED | OUTPATIENT
Start: 2025-06-12

## 2025-06-12 RX ORDER — FLUOCINONIDE 0.5 MG/G
CREAM TOPICAL
Qty: 60 G | Refills: 1 | Status: SHIPPED | OUTPATIENT
Start: 2025-06-12

## 2025-06-12 RX ORDER — DOXYCYCLINE HYCLATE 100 MG
100 TABLET ORAL 2 TIMES DAILY
Qty: 20 TABLET | Refills: 1 | Status: SHIPPED | OUTPATIENT
Start: 2025-06-12

## 2025-06-12 NOTE — PROGRESS NOTES
CC: Spot check    Subjective: Patient previously seen in 2021 returns as new patient here for spots to check over:  rt helix prev bx somewhere else results were pre-cancer, in last months getting more red, sore, sleeps on right side every night due to left leg/hip pain  Left upper chest, red spot  rt mid back under bra line, itching  rosacea    Using metrogel on face    History of skin cancer: No  History of precancers/actinic keratoses: No  History of biopsies:Yes, Details: RT ear, benign  History of blistering/severe sunburns:Yes, Details: Lifetime exposure, lived in [place]  Family history of skin cancer:No  Family history of atypical moles:No    ROS: no fevers/chills. No itch.  No cough  Relevant PMH: NC  Social: NS    Path r ear 2019: AK    PE: Gen:WDWN female in NAD. Skin: focal exam: right mid helix - erythematous patch with minimal overlying surface crusting. Mildly TTP. Midface erythema. Left upper chest - pearly papule +telang. Back without notable suspicious lesions or rashes    A/P: back itching:   -lidex cream BID PRN, se reviewed  -moisturizer use    Neoplasm NOS: chest - BCC  -consent for bx, including R/B/A. Cleaned with EtOH, anesthesia with lidocaine 1% + epinephrine, shave bx, AlCl3 for hemostasis  -vaseline/bandage and wound care reviewed    R helix: consider AK/cannot exclude SCCIS vs inflammatory - CHN/infection:  -reviewed bx, but less interested due to defect in helix from prior bx  -will trial doxycycline 100mg PO BID, se reviewed X 10days.   -to RTC 4-6 weeks. If persists, would advised bx, patient agrees  -encouraged donut pillow to reduce pressure on ear during sleep    Rosacea: face:  -sunprotection reviewed  -metrogel refilled        I have reviewed medications relevant to my specialty.

## 2025-06-19 ENCOUNTER — TELEPHONE (OUTPATIENT)
Dept: DERMATOLOGY | Facility: IMAGING CENTER | Age: 80
End: 2025-06-19
Payer: MEDICARE

## 2025-06-19 NOTE — TELEPHONE ENCOUNTER
LVM to RCC    Bx results: BCC sup/nodular, chest. Margins clear  Recommendation: options to treat - ED&C/efudex, excision, observe for regrowth - low risk tumor, low risk site, clear margins    Path form in MD box    MD needs to speak to patient    Blayne JEFFREY

## 2025-07-10 ENCOUNTER — OFFICE VISIT (OUTPATIENT)
Dept: DERMATOLOGY | Facility: IMAGING CENTER | Age: 80
End: 2025-07-10
Payer: MEDICARE

## 2025-07-10 DIAGNOSIS — D49.2 NEOPLASM OF SKIN: Primary | ICD-10-CM

## 2025-07-10 DIAGNOSIS — L29.9 ITCHING: ICD-10-CM

## 2025-07-10 DIAGNOSIS — Z85.828 HISTORY OF BASAL CELL CARCINOMA: ICD-10-CM

## 2025-07-10 NOTE — PROGRESS NOTES
CC: Follow-Up     Subjective: prev seen patient here for spots to check over:    Chest biopsy site healing well.  Back itching improving    rt helix prev bx somewhere else results were pre-cancer, in last months getting more red, sore, sleeps on right side every night due to left leg/hip pain - trx for CHN / ABX with some improvement noted but not resolved.    History of skin cancer: yes - chest BCC - sup/nod, clear margins June 2025  History of precancers/actinic keratoses: No  History of biopsies:Yes, Details: RT ear, benign  History of blistering/severe sunburns:Yes, Details: Lifetime exposure, lived in [place]  Family history of skin cancer:No  Family history of atypical moles:No    ROS: no fevers/chills. No itch.  No cough  Relevant PMH: NC. No pacemaker  Social: NS    Path r ear 2019: AK    PE: Gen:WDWN female in NAD. Skin: focal exam: right mid helix - erythematous patch with minimal overlying surface crusting/scaling. NoTTP.  Chest - biopsy site healing    A/P:   Neoplasm NOS:R helix: consider AK/cannot exclude SCCIS vs inflammatory - CHN/infection:  -consent for bx, including R/B/A. Cleaned with EtOH, anesthesia with lidocaine 1% + epinephrine, shave bx, hyfrecator for hemostasis  -vaseline/bandage and wound care reviewed    Hx of skin cancer:  -cont'd sunprotection and skin cancer surveillance  -Q 6mo-annual exam recommended; f/u suspicious lesions PRN  Chest, BCC - healing. Will continue to observe  -f/u 6 months    Itching, back: improved  Home supply Lidex cream BID -->PRN    I have reviewed medications relevant to my specialty.

## 2025-07-22 ENCOUNTER — APPOINTMENT (OUTPATIENT)
Dept: RADIOLOGY | Facility: MEDICAL CENTER | Age: 80
End: 2025-07-22
Attending: STUDENT IN AN ORGANIZED HEALTH CARE EDUCATION/TRAINING PROGRAM
Payer: MEDICARE

## 2025-07-28 NOTE — PROGRESS NOTES
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    Current Medications and Prescriptions Ordered in Epic[1]      ROS:  Gen: no fevers/chills  Pulm: no sob, no cough  CV: no chest pain, no palpitations  GI: no nausea/vomiting, no diarrhea        Objective:     Exam:  There were no vitals taken for this visit. There is no height or weight on file to calculate BMI.    Gen: Alert and oriented, No apparent distress.  Neck: Neck is supple without lymphadenopathy.  Lungs: Normal effort, CTA bilaterally, no wheezes, rhonchi, or rales  CV: Regular rate and rhythm. No murmurs, rubs, or gallops.  Ext: No edema.      Assessment & Plan:     79 y.o. female with the following -     Problem List Items Addressed This Visit    None            No follow-ups on file.               [1]   Current Outpatient Medications Ordered in Epic   Medication Sig Dispense Refill    metronidazole (METROGEL) 0.75 % gel Apply 1 Application topically 2 times a day. APPLY TOPICALLY TO THE AFFECTED AREA TWICE DAILY 45 g 1    doxycycline (VIBRAMYCIN) 100 MG Tab Take 1 Tablet by mouth 2 times a day. 20 Tablet 1    fluocinonide (LIDEX) 0.05 % Cream AAA back BID PRN itching. Do not use on face, axilla, groin 60 g 1    clonazePAM (KLONOPIN) 1 MG Tab Take 1 Tablet by mouth every day for 90 days. 90 Tablet 0    ibuprofen (MOTRIN) 800 MG Tab Take 1 Tablet by mouth every 8 hours as needed for Moderate Pain. 50 Tablet 0    gabapentin (NEURONTIN) 800 MG tablet Take 1.5 Tablets by mouth 3 times a day. 405 Tablet 3    lisinopril (PRINIVIL) 20 MG Tab Take 1 Tablet by mouth 2  times a day. 200 Tablet 3    propranolol (INDERAL) 40 MG Tab Take 1 Tablet by mouth 2 times a day. 180 Tablet 3    ROPINIRole (REQUIP) 1 MG Tab Take 1 Tablet by mouth 4 times a day for 360 days. 360 Tablet 3    tizanidine (ZANAFLEX) 4 MG Tab Take 1 Tablet by mouth 3 times a day. for spasm 270 Tablet 3    ascorbic acid (VITAMIN C) 500 MG tablet ASCORBIC ACID 500 MG TABS      Cholecalciferol (PA VITAMIN D-3) 2000 UNIT Cap PA VITAMIN D-3 2000 UNIT ORAL CAPSULE      Coenzyme Q10 100 MG Cap COENZYME Q10 100 MG CAPS      diphenhydrAMINE (BENADRYL) 50 MG Cap DIPHENHYDRAMINE HCL 50 MG CAPS      traZODone (DESYREL) 100 MG Tab Take 1 Tablet by mouth every evening. 90 Tablet 3    Non Formulary Request Take 1 Tab by mouth 3 times a day. **BETA TCD** for gallbladder      Non Formulary Request Take 30 Drops by mouth 3 times a day. **SUPER PHOSPHATE LIQ**      Non Formulary Request Take 1 Tab by mouth every bedtime. **MAGNESIUM AND POTASSIUM ASPARTATE**       No current Epic-ordered facility-administered medications on file.

## 2025-07-29 ENCOUNTER — APPOINTMENT (OUTPATIENT)
Dept: MEDICAL GROUP | Facility: CLINIC | Age: 80
End: 2025-07-29
Payer: MEDICARE

## 2025-07-29 DIAGNOSIS — M16.12 ARTHRITIS OF LEFT HIP: ICD-10-CM

## 2025-07-29 DIAGNOSIS — M47.817 LUMBOSACRAL SPONDYLOSIS WITHOUT MYELOPATHY: Primary | ICD-10-CM

## 2025-07-29 DIAGNOSIS — Z11.4 SCREENING FOR HIV (HUMAN IMMUNODEFICIENCY VIRUS): ICD-10-CM

## 2025-07-29 DIAGNOSIS — G62.9 NEUROPATHY: ICD-10-CM

## 2025-07-31 RX ORDER — IBUPROFEN 800 MG/1
800 TABLET, FILM COATED ORAL EVERY 8 HOURS PRN
Qty: 50 TABLET | Refills: 0 | Status: SHIPPED | OUTPATIENT
Start: 2025-07-31

## 2025-07-31 NOTE — ASSESSMENT & PLAN NOTE
She is due to follow-up with her pain management doctor next week for further injections.  She usually gets very good results.

## 2025-07-31 NOTE — PROGRESS NOTES
Patient was presented for a telehealth consultation via secure and encrypted videoconferencing technology.    Telemedicine: Established Patient   This evaluation was conducted via Teams using secure and encrypted videoconferencing technology. The patient was in their home in the [facility].    The patient's identity was confirmed and verbal consent was obtained for this virtual visit.    Subjective:   CC:   Chief Complaint   Patient presents with    Medication Refill       Jessica Abad is a 79 y.o. 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) f/u brain MRI surveillance 2018 stable, hx sm CVA  -RLS - requip  Chronic pain, LBP, lumbar spondylosis, L hip pain xray +OA (IRMA) s/p steroid injection 5/25- gabapentin, PT  Anxiety - on klonopin chronically  Hx BCC recently at dermatologist, seen regularly  Labs 1/24 - nl CBC,CMP,TSH,ESR  DXA 3/25 ospeopenia  Vaccines due    ROS      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 01/28/2019    Rosacea 01/28/2019    Actinic keratoses 01/28/2019    Lumbosacral spondylosis without myelopathy 03/13/2015    Hypertension 02/04/2015    Back pain 08/07/2014    Anemia 12/07/2011    JIMMY (generalized anxiety disorder) 12/07/2011    Cerebral infarction (HCC)     Dysplastic cerebellar gangliocytoma (HCC)        Family History   Problem Relation Age of Onset     Heart Disease Father         MI AT 42 Y/0    Hypertension Father     Stroke Mother     Breast Cancer Mother     Diabetes Maternal Grandfather        She  has a past medical history of Actinic keratitis (2019), Actinic keratoses (2019), Anxiety, Breath shortness, CVA (cerebral infarction) (), DDD (degenerative disc disease), lumbosacral, Degenerative joint disease, DJD (degenerative joint disease), Dysplastic cerebellar gangliocytoma (HCC), Fibromyalgia, Insomnia, 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  She  has a past surgical history that includes other orthopedic surgery; pr inj dx/ther agnt paravert facet joint, jaja* (2011); pr inj dx/ther agnt paravert facet joint, ce* (2011); abdominal hysterectomy total; neuro dest facet l/s w/ig sngl (2014); neuro dest facet l/s w/ig addl (2014); neuro dest facet l/s w/ig addl (2014); neuro dest facet l/s w/ig addl (2014); neuro dest facet l/s w/ig sngl (3/13/2015); and knee replacement, total (Right).       Objective:   There were no vitals taken for this visit.    Physical Exam    Assessment and Plan:   The following treatment plan was discussed:     1. Lumbosacral spondylosis without myelopathy    2. Arthritis of left hip    3. Neuropathy  - Comp Metabolic Panel    4. Screening for HIV (human immunodeficiency virus)  - HCV Scrn ( [id number] 1xLife - Medicare Patients Only); Future        Follow-up: No follow-ups on file.               [1]   Allergies  Allergen Reactions    Nsaids Hives, Rash and Itching    Asa [Aspirin]      BAD ACID REFLUX    Codeine     Morphine     Sulfa Drugs    [2]   Current Outpatient Medications   Medication Sig Dispense Refill    metronidazole (METROGEL) 0.75 % gel Apply 1 Application topically 2 times a day. APPLY TOPICALLY TO THE AFFECTED  AREA TWICE DAILY 45 g 1    doxycycline (VIBRAMYCIN) 100 MG Tab Take 1 Tablet by mouth 2 times a day. 20 Tablet 1    fluocinonide (LIDEX) 0.05 % Cream AAA back BID PRN itching. Do not use on face, axilla, groin 60 g 1    clonazePAM (KLONOPIN) 1 MG Tab Take 1 Tablet by mouth every day for 90 days. 90 Tablet 0    ibuprofen (MOTRIN) 800 MG Tab Take 1 Tablet by mouth every 8 hours as needed for Moderate Pain. 50 Tablet 0    gabapentin (NEURONTIN) 800 MG tablet Take 1.5 Tablets by mouth 3 times a day. 405 Tablet 3    lisinopril (PRINIVIL) 20 MG Tab Take 1 Tablet by mouth 2 times a day. 200 Tablet 3    propranolol (INDERAL) 40 MG Tab Take 1 Tablet by mouth 2 times a day. 180 Tablet 3    ROPINIRole (REQUIP) 1 MG Tab Take 1 Tablet by mouth 4 times a day for 360 days. 360 Tablet 3    tizanidine (ZANAFLEX) 4 MG Tab Take 1 Tablet by mouth 3 times a day. for spasm 270 Tablet 3    ascorbic acid (VITAMIN C) 500 MG tablet ASCORBIC ACID 500 MG TABS      Cholecalciferol (PA VITAMIN D-3) 2000 UNIT Cap PA VITAMIN D-3 2000 UNIT ORAL CAPSULE      Coenzyme Q10 100 MG Cap COENZYME Q10 100 MG CAPS      diphenhydrAMINE (BENADRYL) 50 MG Cap DIPHENHYDRAMINE HCL 50 MG CAPS      traZODone (DESYREL) 100 MG Tab Take 1 Tablet by mouth every evening. 90 Tablet 3    Non Formulary Request Take 1 Tab by mouth 3 times a day. **BETA TCD** for gallbladder      Non Formulary Request Take 30 Drops by mouth 3 times a day. **SUPER PHOSPHATE LIQ**      Non Formulary Request Take 1 Tab by mouth every bedtime. **MAGNESIUM AND POTASSIUM ASPARTATE**       No current facility-administered medications for this visit.

## 2025-07-31 NOTE — TELEPHONE ENCOUNTER
Received request via: Pharmacy    Was the patient seen in the last year in this department? Yes    Does the patient have an active prescription (recently filled or refills available) for medication(s) requested? No    Pharmacy Name: [facility]    Does the patient have intermediate Plus and need 100-day supply? (This applies to ALL medications) Yes

## 2025-08-26 ENCOUNTER — APPOINTMENT (OUTPATIENT)
Dept: RADIOLOGY | Facility: MEDICAL CENTER | Age: 80
End: 2025-08-26
Attending: STUDENT IN AN ORGANIZED HEALTH CARE EDUCATION/TRAINING PROGRAM
Payer: MEDICARE

## 2025-09-16 ENCOUNTER — APPOINTMENT (OUTPATIENT)
Dept: MEDICAL GROUP | Facility: CLINIC | Age: 80
End: 2025-09-16
Payer: MEDICARE